# Patient Record
Sex: FEMALE | Race: BLACK OR AFRICAN AMERICAN | Employment: OTHER | URBAN - METROPOLITAN AREA
[De-identification: names, ages, dates, MRNs, and addresses within clinical notes are randomized per-mention and may not be internally consistent; named-entity substitution may affect disease eponyms.]

---

## 2017-01-30 ENCOUNTER — HOSPITAL ENCOUNTER (OUTPATIENT)
Dept: MAMMOGRAPHY | Age: 67
Discharge: HOME | End: 2017-01-30
Attending: FAMILY MEDICINE
Payer: MEDICARE

## 2017-01-30 DIAGNOSIS — Z12.31 VISIT FOR SCREENING MAMMOGRAM: ICD-10-CM

## 2017-01-30 PROCEDURE — 77067 SCR MAMMO BI INCL CAD: CPT

## 2018-03-07 ENCOUNTER — HOSPITAL ENCOUNTER (OUTPATIENT)
Dept: BONE DENSITY | Age: 68
Discharge: HOME | End: 2018-03-07
Attending: FAMILY MEDICINE
Payer: MEDICARE

## 2018-03-07 ENCOUNTER — HOSPITAL ENCOUNTER (OUTPATIENT)
Dept: MAMMOGRAPHY | Age: 68
Discharge: HOME | End: 2018-03-07
Attending: FAMILY MEDICINE
Payer: MEDICARE

## 2018-03-07 DIAGNOSIS — Z12.39 BREAST SCREENING: ICD-10-CM

## 2018-03-07 DIAGNOSIS — M81.0 OSTEOPOROSIS: ICD-10-CM

## 2018-03-07 PROCEDURE — 77080 DXA BONE DENSITY AXIAL: CPT

## 2018-03-07 PROCEDURE — 77067 SCR MAMMO BI INCL CAD: CPT

## 2019-04-04 ENCOUNTER — HOSPITAL ENCOUNTER (OUTPATIENT)
Dept: MAMMOGRAPHY | Age: 69
Discharge: HOME | End: 2019-04-04
Attending: FAMILY MEDICINE
Payer: MEDICARE

## 2019-04-04 DIAGNOSIS — Z12.39 SCREENING BREAST EXAMINATION: ICD-10-CM

## 2019-04-04 PROCEDURE — 77067 SCR MAMMO BI INCL CAD: CPT

## 2021-04-26 ENCOUNTER — TRANSCRIBE ORDER (OUTPATIENT)
Dept: SCHEDULING | Age: 71
End: 2021-04-26

## 2021-04-26 DIAGNOSIS — M81.0 OSTEOPOROSIS: ICD-10-CM

## 2021-04-26 DIAGNOSIS — Z12.31 VISIT FOR SCREENING MAMMOGRAM: Primary | ICD-10-CM

## 2021-07-13 ENCOUNTER — APPOINTMENT (OUTPATIENT)
Dept: GENERAL RADIOLOGY | Age: 71
DRG: 177 | End: 2021-07-13
Attending: EMERGENCY MEDICINE
Payer: MEDICARE

## 2021-07-13 ENCOUNTER — APPOINTMENT (OUTPATIENT)
Dept: CT IMAGING | Age: 71
DRG: 177 | End: 2021-07-13
Attending: EMERGENCY MEDICINE
Payer: MEDICARE

## 2021-07-13 ENCOUNTER — HOSPITAL ENCOUNTER (INPATIENT)
Age: 71
LOS: 10 days | Discharge: LEFT BEFORE DISCHARGE | DRG: 177 | End: 2021-07-23
Attending: EMERGENCY MEDICINE | Admitting: FAMILY MEDICINE
Payer: MEDICARE

## 2021-07-13 DIAGNOSIS — E11.65 TYPE 2 DIABETES MELLITUS WITH HYPERGLYCEMIA, WITHOUT LONG-TERM CURRENT USE OF INSULIN (HCC): ICD-10-CM

## 2021-07-13 DIAGNOSIS — U07.1 COVID-19: Primary | ICD-10-CM

## 2021-07-13 DIAGNOSIS — E86.0 DEHYDRATION: ICD-10-CM

## 2021-07-13 DIAGNOSIS — R73.9 HYPERGLYCEMIA: ICD-10-CM

## 2021-07-13 DIAGNOSIS — R45.1 AGITATION: ICD-10-CM

## 2021-07-13 DIAGNOSIS — R09.02 HYPOXIA: ICD-10-CM

## 2021-07-13 DIAGNOSIS — R53.81 DEBILITY: ICD-10-CM

## 2021-07-13 LAB
ALBUMIN SERPL-MCNC: 3.4 G/DL (ref 3.5–5)
ALBUMIN/GLOB SERPL: 0.7 {RATIO} (ref 1.1–2.2)
ALP SERPL-CCNC: 66 U/L (ref 45–117)
ALT SERPL-CCNC: 16 U/L (ref 12–78)
ANION GAP SERPL CALC-SCNC: 10 MMOL/L (ref 5–15)
ARTERIAL PATENCY WRIST A: YES
AST SERPL-CCNC: 28 U/L (ref 15–37)
BASE DEFICIT BLDA-SCNC: 4.3 MMOL/L
BASOPHILS # BLD: 0 K/UL (ref 0–0.1)
BASOPHILS NFR BLD: 0 % (ref 0–1)
BDY SITE: ABNORMAL
BILIRUB SERPL-MCNC: 0.6 MG/DL (ref 0.2–1)
BUN SERPL-MCNC: 16 MG/DL (ref 6–20)
BUN/CREAT SERPL: 13 (ref 12–20)
CALCIUM SERPL-MCNC: 9.1 MG/DL (ref 8.5–10.1)
CHLORIDE SERPL-SCNC: 101 MMOL/L (ref 97–108)
CO2 SERPL-SCNC: 23 MMOL/L (ref 21–32)
COMMENT, HOLDF: NORMAL
COVID-19 RAPID TEST, COVR: DETECTED
CREAT SERPL-MCNC: 1.26 MG/DL (ref 0.55–1.02)
CRP SERPL-MCNC: 11.7 MG/DL (ref 0–0.6)
DIFFERENTIAL METHOD BLD: NORMAL
EOSINOPHIL # BLD: 0 K/UL (ref 0–0.4)
EOSINOPHIL NFR BLD: 0 % (ref 0–7)
ERYTHROCYTE [DISTWIDTH] IN BLOOD BY AUTOMATED COUNT: 13.9 % (ref 11.5–14.5)
GAS FLOW.O2 O2 DELIVERY SYS: 5 L/MIN
GLOBULIN SER CALC-MCNC: 5 G/DL (ref 2–4)
GLUCOSE SERPL-MCNC: 342 MG/DL (ref 65–100)
HCO3 BLDA-SCNC: 19 MMOL/L (ref 22–26)
HCT VFR BLD AUTO: 36.6 % (ref 35–47)
HGB BLD-MCNC: 12 G/DL (ref 11.5–16)
IMM GRANULOCYTES # BLD AUTO: 0 K/UL (ref 0–0.04)
IMM GRANULOCYTES NFR BLD AUTO: 0 % (ref 0–0.5)
LACTATE SERPL-SCNC: 1.4 MMOL/L (ref 0.4–2)
LYMPHOCYTES # BLD: 0.8 K/UL (ref 0.8–3.5)
LYMPHOCYTES NFR BLD: 15 % (ref 12–49)
MCH RBC QN AUTO: 27.8 PG (ref 26–34)
MCHC RBC AUTO-ENTMCNC: 32.8 G/DL (ref 30–36.5)
MCV RBC AUTO: 84.9 FL (ref 80–99)
MONOCYTES # BLD: 0.5 K/UL (ref 0–1)
MONOCYTES NFR BLD: 10 % (ref 5–13)
NEUTS SEG # BLD: 3.9 K/UL (ref 1.8–8)
NEUTS SEG NFR BLD: 75 % (ref 32–75)
NRBC # BLD: 0 K/UL (ref 0–0.01)
NRBC BLD-RTO: 0 PER 100 WBC
PCO2 BLDA: 31 MMHG (ref 35–45)
PH BLDA: 7.41 [PH] (ref 7.35–7.45)
PLATELET # BLD AUTO: 256 K/UL (ref 150–400)
PMV BLD AUTO: 11.4 FL (ref 8.9–12.9)
PO2 BLDA: 64 MMHG (ref 80–100)
POTASSIUM SERPL-SCNC: 4.1 MMOL/L (ref 3.5–5.1)
PROCALCITONIN SERPL-MCNC: 0.08 NG/ML
PROT SERPL-MCNC: 8.4 G/DL (ref 6.4–8.2)
RBC # BLD AUTO: 4.31 M/UL (ref 3.8–5.2)
SAMPLES BEING HELD,HOLD: NORMAL
SAO2 % BLD: 93 % (ref 92–97)
SAO2% DEVICE SAO2% SENSOR NAME: ABNORMAL
SODIUM SERPL-SCNC: 134 MMOL/L (ref 136–145)
SOURCE, COVRS: ABNORMAL
SPECIMEN SITE: ABNORMAL
TROPONIN I SERPL-MCNC: <0.05 NG/ML
WBC # BLD AUTO: 5.2 K/UL (ref 3.6–11)

## 2021-07-13 PROCEDURE — 74011250637 HC RX REV CODE- 250/637: Performed by: FAMILY MEDICINE

## 2021-07-13 PROCEDURE — 71045 X-RAY EXAM CHEST 1 VIEW: CPT

## 2021-07-13 PROCEDURE — 84145 PROCALCITONIN (PCT): CPT

## 2021-07-13 PROCEDURE — 36415 COLL VENOUS BLD VENIPUNCTURE: CPT

## 2021-07-13 PROCEDURE — 85025 COMPLETE CBC W/AUTO DIFF WBC: CPT

## 2021-07-13 PROCEDURE — 74011250636 HC RX REV CODE- 250/636: Performed by: FAMILY MEDICINE

## 2021-07-13 PROCEDURE — 74011000636 HC RX REV CODE- 636: Performed by: RADIOLOGY

## 2021-07-13 PROCEDURE — 82803 BLOOD GASES ANY COMBINATION: CPT

## 2021-07-13 PROCEDURE — 87635 SARS-COV-2 COVID-19 AMP PRB: CPT

## 2021-07-13 PROCEDURE — 86140 C-REACTIVE PROTEIN: CPT

## 2021-07-13 PROCEDURE — 84484 ASSAY OF TROPONIN QUANT: CPT

## 2021-07-13 PROCEDURE — 65660000000 HC RM CCU STEPDOWN

## 2021-07-13 PROCEDURE — 74011000258 HC RX REV CODE- 258: Performed by: FAMILY MEDICINE

## 2021-07-13 PROCEDURE — 74011250636 HC RX REV CODE- 250/636: Performed by: NURSE PRACTITIONER

## 2021-07-13 PROCEDURE — XW033E5 INTRODUCTION OF REMDESIVIR ANTI-INFECTIVE INTO PERIPHERAL VEIN, PERCUTANEOUS APPROACH, NEW TECHNOLOGY GROUP 5: ICD-10-PCS | Performed by: INTERNAL MEDICINE

## 2021-07-13 PROCEDURE — 80053 COMPREHEN METABOLIC PANEL: CPT

## 2021-07-13 PROCEDURE — 74011250636 HC RX REV CODE- 250/636: Performed by: EMERGENCY MEDICINE

## 2021-07-13 PROCEDURE — 87040 BLOOD CULTURE FOR BACTERIA: CPT

## 2021-07-13 PROCEDURE — 36600 WITHDRAWAL OF ARTERIAL BLOOD: CPT

## 2021-07-13 PROCEDURE — 83605 ASSAY OF LACTIC ACID: CPT

## 2021-07-13 PROCEDURE — 74011000250 HC RX REV CODE- 250: Performed by: FAMILY MEDICINE

## 2021-07-13 PROCEDURE — 8E0ZXY6 ISOLATION: ICD-10-PCS | Performed by: HOSPITALIST

## 2021-07-13 PROCEDURE — 74011000258 HC RX REV CODE- 258: Performed by: EMERGENCY MEDICINE

## 2021-07-13 PROCEDURE — 96365 THER/PROPH/DIAG IV INF INIT: CPT

## 2021-07-13 PROCEDURE — 99285 EMERGENCY DEPT VISIT HI MDM: CPT

## 2021-07-13 PROCEDURE — 71275 CT ANGIOGRAPHY CHEST: CPT

## 2021-07-13 RX ORDER — ACETAMINOPHEN 325 MG/1
650 TABLET ORAL
Status: DISCONTINUED | OUTPATIENT
Start: 2021-07-13 | End: 2021-07-23 | Stop reason: HOSPADM

## 2021-07-13 RX ORDER — GUAIFENESIN 100 MG/5ML
81 LIQUID (ML) ORAL DAILY
Status: DISCONTINUED | OUTPATIENT
Start: 2021-07-13 | End: 2021-07-23 | Stop reason: HOSPADM

## 2021-07-13 RX ORDER — HYDRALAZINE HYDROCHLORIDE 20 MG/ML
10 INJECTION INTRAMUSCULAR; INTRAVENOUS
Status: DISCONTINUED | OUTPATIENT
Start: 2021-07-13 | End: 2021-07-23 | Stop reason: HOSPADM

## 2021-07-13 RX ORDER — ASCORBIC ACID 500 MG
500 TABLET ORAL 2 TIMES DAILY
Status: DISCONTINUED | OUTPATIENT
Start: 2021-07-13 | End: 2021-07-23 | Stop reason: HOSPADM

## 2021-07-13 RX ORDER — SODIUM CHLORIDE 9 MG/ML
75 INJECTION, SOLUTION INTRAVENOUS CONTINUOUS
Status: DISCONTINUED | OUTPATIENT
Start: 2021-07-13 | End: 2021-07-15

## 2021-07-13 RX ORDER — SODIUM CHLORIDE 0.9 % (FLUSH) 0.9 %
5-40 SYRINGE (ML) INJECTION AS NEEDED
Status: DISCONTINUED | OUTPATIENT
Start: 2021-07-13 | End: 2021-07-23 | Stop reason: HOSPADM

## 2021-07-13 RX ORDER — DEXAMETHASONE 4 MG/1
6 TABLET ORAL DAILY
Status: DISCONTINUED | OUTPATIENT
Start: 2021-07-14 | End: 2021-07-13

## 2021-07-13 RX ORDER — HYDRALAZINE HYDROCHLORIDE 20 MG/ML
10 INJECTION INTRAMUSCULAR; INTRAVENOUS
Status: DISCONTINUED | OUTPATIENT
Start: 2021-07-13 | End: 2021-07-13

## 2021-07-13 RX ORDER — HEPARIN SODIUM 5000 [USP'U]/ML
5000 INJECTION, SOLUTION INTRAVENOUS; SUBCUTANEOUS EVERY 8 HOURS
Status: DISCONTINUED | OUTPATIENT
Start: 2021-07-13 | End: 2021-07-14 | Stop reason: ALTCHOICE

## 2021-07-13 RX ORDER — ZINC SULFATE 50(220)MG
1 CAPSULE ORAL DAILY
Status: DISCONTINUED | OUTPATIENT
Start: 2021-07-14 | End: 2021-07-23 | Stop reason: HOSPADM

## 2021-07-13 RX ORDER — SODIUM CHLORIDE 0.9 % (FLUSH) 0.9 %
5-40 SYRINGE (ML) INJECTION EVERY 8 HOURS
Status: DISCONTINUED | OUTPATIENT
Start: 2021-07-13 | End: 2021-07-23 | Stop reason: HOSPADM

## 2021-07-13 RX ADMIN — CEFTRIAXONE SODIUM 2 G: 2 INJECTION, POWDER, FOR SOLUTION INTRAMUSCULAR; INTRAVENOUS at 11:25

## 2021-07-13 RX ADMIN — HYDRALAZINE HYDROCHLORIDE 10 MG: 20 INJECTION INTRAMUSCULAR; INTRAVENOUS at 22:35

## 2021-07-13 RX ADMIN — Medication 10 ML: at 22:18

## 2021-07-13 RX ADMIN — SODIUM CHLORIDE 500 ML: 9 INJECTION, SOLUTION INTRAVENOUS at 11:40

## 2021-07-13 RX ADMIN — HEPARIN SODIUM 5000 UNITS: 5000 INJECTION INTRAVENOUS; SUBCUTANEOUS at 15:47

## 2021-07-13 RX ADMIN — DEXAMETHASONE 6 MG: 4 TABLET ORAL at 15:47

## 2021-07-13 RX ADMIN — HYDRALAZINE HYDROCHLORIDE 10 MG: 20 INJECTION, SOLUTION INTRAMUSCULAR; INTRAVENOUS at 15:47

## 2021-07-13 RX ADMIN — HEPARIN SODIUM 5000 UNITS: 5000 INJECTION INTRAVENOUS; SUBCUTANEOUS at 22:18

## 2021-07-13 RX ADMIN — AZITHROMYCIN MONOHYDRATE 500 MG: 500 INJECTION, POWDER, LYOPHILIZED, FOR SOLUTION INTRAVENOUS at 12:36

## 2021-07-13 RX ADMIN — REMDESIVIR 200 MG: 100 INJECTION, POWDER, LYOPHILIZED, FOR SOLUTION INTRAVENOUS at 15:03

## 2021-07-13 RX ADMIN — SODIUM CHLORIDE 75 ML/HR: 9 INJECTION, SOLUTION INTRAVENOUS at 16:06

## 2021-07-13 RX ADMIN — IOPAMIDOL 80 ML: 755 INJECTION, SOLUTION INTRAVENOUS at 10:16

## 2021-07-13 RX ADMIN — OXYCODONE HYDROCHLORIDE AND ACETAMINOPHEN 500 MG: 500 TABLET ORAL at 18:58

## 2021-07-13 RX ADMIN — Medication 10 ML: at 15:04

## 2021-07-13 RX ADMIN — ASPIRIN 81 MG: 81 TABLET, CHEWABLE ORAL at 15:47

## 2021-07-13 NOTE — Clinical Note
Status[de-identified] INPATIENT [101]   Type of Bed: Telemetry [19]   Cardiac Monitoring Required?: Yes   Inpatient Hospitalization Certified Necessary for the Following Reasons: 9.  Other (further clarification in H&P documentation)   Admitting Diagnosis: COVID-19 [[id number]]   Admitting Physician: Zelalem Crystal [[id number]]   Attending Physician: Betito Parra   Estimated Length of Stay: 3-4 Midnights   Discharge Plan[de-identified] Home with Office Follow-up

## 2021-07-13 NOTE — ED PROVIDER NOTES
70-year-old female without any significant medical history presents to the emergency department today with chief complaint of shortness of breath and generalized myalgias and fatigue. Is been going on for several days to a week. She was diagnosed with Covid at her primary care's office couple of days ago. EMS reports that she was hypoxic on room air in the low 80s. She endorses chest pain, myalgias, fatigue. No nausea or vomiting. The history is provided by the patient and medical records. Shortness of Breath  This is a new problem. The problem occurs continuously. The current episode started more than 2 days ago. The problem has been rapidly worsening. Associated symptoms include a fever, headaches, sore throat, cough, chest pain and abdominal pain. Pertinent negatives include no vomiting, no rash and no leg swelling. No past medical history on file. No past surgical history on file. No family history on file. Social History     Socioeconomic History    Marital status:      Spouse name: Not on file    Number of children: Not on file    Years of education: Not on file    Highest education level: Not on file   Occupational History    Not on file   Tobacco Use    Smoking status: Not on file   Substance and Sexual Activity    Alcohol use: Not on file    Drug use: Not on file    Sexual activity: Not on file   Other Topics Concern    Not on file   Social History Narrative    Not on file     Social Determinants of Health     Financial Resource Strain:     Difficulty of Paying Living Expenses:    Food Insecurity:     Worried About Running Out of Food in the Last Year:     [address] in the Last Year:    Transportation Needs:     Lack of Transportation (Medical):      Lack of Transportation (Non-Medical):    Physical Activity:     Days of Exercise per Week:     Minutes of Exercise per Session:    Stress:     Feeling of Stress :    Social Connections:     Frequency of Communication with Friends and Family:     Frequency of Social Gatherings with Friends and Family:     Attends [employer] Services:     Active Member of Clubs or Organizations:     Attends Club or Organization Meetings:     Marital Status:    Intimate Partner Violence:     Fear of Current or Ex-Partner:     Emotionally Abused:     Physically Abused:     Sexually Abused: ALLERGIES: Patient has no allergy information on record. Review of Systems   Constitutional: Positive for fatigue and fever. HENT: Positive for sore throat. Negative for sneezing. Respiratory: Positive for cough and shortness of breath. Cardiovascular: Positive for chest pain. Negative for leg swelling. Gastrointestinal: Positive for abdominal pain. Negative for diarrhea, nausea and vomiting. Genitourinary: Negative for difficulty urinating and dysuria. Musculoskeletal: Negative for arthralgias and myalgias. Skin: Negative for color change and rash. Neurological: Positive for headaches. Negative for weakness. Psychiatric/Behavioral: Negative for agitation and behavioral problems. There were no vitals filed for this visit. Physical Exam  Vitals and nursing note reviewed. Constitutional:       General: She is not in acute distress. Appearance: Normal appearance. She is well-developed. She is not ill-appearing, toxic-appearing or diaphoretic. HENT:      Head: Normocephalic and atraumatic. Nose: Nose normal.      Mouth/Throat:      Mouth: Mucous membranes are moist.      Pharynx: Oropharynx is clear. Eyes:      Extraocular Movements: Extraocular movements intact. Conjunctiva/sclera: Conjunctivae normal.      Pupils: Pupils are equal, round, and reactive to light. Cardiovascular:      Rate and Rhythm: Normal rate and regular rhythm. Pulses: Normal pulses. Heart sounds: Normal heart sounds. Pulmonary:      Effort: Pulmonary effort is normal. No respiratory distress. Breath sounds: Normal breath sounds. No wheezing. Chest:      Chest wall: No tenderness. Abdominal:      General: Abdomen is flat. There is no distension. Palpations: Abdomen is soft. Tenderness: There is no abdominal tenderness. There is no guarding or rebound. Musculoskeletal:         General: No swelling, tenderness, deformity or signs of injury. Normal range of motion. Cervical back: Normal range of motion and neck supple. No rigidity. No muscular tenderness. Right lower leg: No edema. Left lower leg: No edema. Skin:     General: Skin is warm and dry. Capillary Refill: Capillary refill takes less than 2 seconds. Neurological:      General: No focal deficit present. Mental Status: She is alert and oriented to person, place, and time. Psychiatric:         Mood and Affect: Mood normal.         Behavior: Behavior normal.          MDM  Number of Diagnoses or Management Options  Diagnosis management comments: 22-year-old female presents as above with Covid pneumonia and room air hypoxia. She has some focality on chest x-ray and antibiotics were started in the emergency department. With a negative procalcitonin and significantly elevated I am less suspicious for bacterial process. Plan for admission to hospital for further management. Amount and/or Complexity of Data Reviewed  Clinical lab tests: reviewed  Tests in the radiology section of CPT®: reviewed           Procedures               Perfect Serve Consult for Admission  11:46 AM    ED Room Number: ER09/09  Patient Name and age:  Stefanie Blake 79 y.o.  female  Working Diagnosis:   1. COVID-19    2. Hypoxia    3. Hyperglycemia    4.  Dehydration        COVID-19 Suspicion:  yes  Sepsis present:  no  Reassessment needed: N/A  Code Status:  Full Code  Readmission: no  Isolation Requirements:  yes  Recommended Level of Care:  telemetry  Department:[facility] Adult ED - 21   Other: Final CT read pending, no PE on my read.   Symptoms are likely all secondary to Covid

## 2021-07-13 NOTE — ROUTINE PROCESS
TRANSFER - IN REPORT:    Verbal report received from [facility] (name) on Aliza Nagy  being received from ED(unit) for routine progression of care      Report consisted of patients Situation, Background, Assessment and   Recommendations(SBAR). Information from the following report(s) SBAR, Kardex, ED Summary, Procedure Summary, Intake/Output, MAR, Accordion, Recent Results, Med Rec Status, Cardiac Rhythm NSR, Alarm Parameters , Pre Procedure Checklist, Procedure Verification, Quality Measures and Dual Neuro Assessment was reviewed with the receiving nurse. Opportunity for questions and clarification was provided. Assessment completed upon patients arrival to unit and care assumed.    Primary Nurse Christa Patino RN and LEDY kirkland performed a dual skin assessment on this patient No impairment noted  Alexys score is 21

## 2021-07-13 NOTE — H&P
History & Physical    Primary Care Provider: Gabe Beltrán MD  Source of Information: Patient     History of Presenting Illness:   Vern Linares is a 79 y.o. female who presents with shortness of breath    Patient presents to the ER complaining of shortness of breath fever and fatigue. Patient also complains of malaise and myalgias. Patient was diagnosed with Covid 2 days ago. Patient reports that this has been going on for about a week to 10 days. When EMS arrived at patient's house they found that the patient was hypoxic with pulse ox in the 80s. Patient came to be ER, was found to be hypoxic and was requested to be admitted to the hospital service. Patient denies any other complaints or problems. The patient denies any Headache, blurry vision, sore throat, trouble swallowing, trouble with speech, chest pain, fever, chills, N/V/D, abd pain, urinary symptoms, constipation, recent travels, sick contacts, focal or generalized neurological symptoms,  falls, injuries, rashes, contact with COVID-19 diagnosed patients, hematemesis, melena, hemoptysis, hematuria, rashes, denies starting any new medications and denies any other concerns or problems besides as mentioned above. Review of Systems:  A comprehensive review of systems was negative except for that written in the History of Present Illness. All other systems reviewed, pertinent positives and negatives noted in HPI    No past medical history on file. No past surgical history on file. Prior to Admission medications    Not on File     No Known Allergies   No family history on file. Family history was discussed with the patient, all pertinent and relevant details are mentioned as above, no other pertinent and relevant family history was noted on my discussion with the patient.   Patient specifically denies any history of Gaucher disease in the family  SOCIAL HISTORY:  Patient resides:  [facility]    SNF    With family care       Smoking history:   None x   Former    Chronic      Alcohol history:   None    Social x   Chronic      Ambulates:   Independently x   w/cane    w/walker    w/wc    CODE STATUS:  DNR    Full x   Other      Objective:     Physical Exam:     Visit Vitals  BP (!) 198/90   Pulse 94   Temp 98.5 °F (36.9 °C)   Resp (!) 38   Ht 5' 5\" (1.651 m)   Wt 92.1 kg (203 lb)   SpO2 96%   BMI 33.78 kg/m²    O2 Flow Rate (L/min): 5 l/min O2 Device: Nasal cannula    General : alert x 3, awake, no acute distress, resting in bed, pleasant female, appears to be stated age  HEENT: PEERL, EOMI, moist mucus membrane, TM clear  Neck: supple, no JVD, no meningeal signs  Chest: Scattered rhonchi throughout lung fields  CVS: S1 S2 heard, Capillary refill less than 2 seconds  Abd: soft/ Non tender, non distended, BS physiological,   Ext: no clubbing, no cyanosis, no edema, brisk 2+ DP pulses  Neuro/Psych: pleasant mood and affect, CN 2-12 grossly intact,   Skin: warm     EKG: Pending      Data Review:     Recent Days:  Recent Labs     07/13/21  1031   WBC 5.2   HGB 12.0   HCT 36.6        Recent Labs     07/13/21  1031   *   K 4.1      CO2 23   *   BUN 16   CREA 1.26*   CA 9.1   ALB 3.4*   ALT 16     No results for input(s): PH, PCO2, PO2, HCO3, FIO2 in the last 72 hours.     24 Hour Results:  Recent Results (from the past 24 hour(s))   CBC WITH AUTOMATED DIFF    Collection Time: 07/13/21 10:31 AM   Result Value Ref Range    WBC 5.2 3.6 - 11.0 K/uL    RBC 4.31 3.80 - 5.20 M/uL    HGB 12.0 11.5 - 16.0 g/dL    HCT 36.6 35.0 - 47.0 %    MCV 84.9 80.0 - 99.0 FL    MCH 27.8 26.0 - 34.0 PG    MCHC 32.8 30.0 - 36.5 g/dL    RDW 13.9 11.5 - 14.5 %    PLATELET 004 029 - 857 K/uL    MPV 11.4 8.9 - 12.9 FL    NRBC 0.0 0  WBC    ABSOLUTE NRBC 0.00 0.00 - 0.01 K/uL    NEUTROPHILS 75 32 - 75 %    LYMPHOCYTES 15 12 - 49 %    MONOCYTES 10 5 - 13 %    EOSINOPHILS 0 0 - 7 %    BASOPHILS 0 0 - 1 %    IMMATURE GRANULOCYTES 0 0.0 - 0.5 %    ABS. NEUTROPHILS 3.9 1.8 - 8.0 K/UL    ABS. LYMPHOCYTES 0.8 0.8 - 3.5 K/UL    ABS. MONOCYTES 0.5 0.0 - 1.0 K/UL    ABS. EOSINOPHILS 0.0 0.0 - 0.4 K/UL    ABS. BASOPHILS 0.0 0.0 - 0.1 K/UL    ABS. IMM. GRANS. 0.0 0.00 - 0.04 K/UL    DF AUTOMATED     PROCALCITONIN    Collection Time: 07/13/21 10:31 AM   Result Value Ref Range    Procalcitonin 0.08 ng/mL   C REACTIVE PROTEIN, QT    Collection Time: 07/13/21 10:31 AM   Result Value Ref Range    C-Reactive protein 11.70 (H) 0.00 - 6.81 mg/dL   METABOLIC PANEL, COMPREHENSIVE    Collection Time: 07/13/21 10:31 AM   Result Value Ref Range    Sodium 134 (L) 136 - 145 mmol/L    Potassium 4.1 3.5 - 5.1 mmol/L    Chloride 101 97 - 108 mmol/L    CO2 23 21 - 32 mmol/L    Anion gap 10 5 - 15 mmol/L    Glucose 342 (H) 65 - 100 mg/dL    BUN 16 6 - 20 MG/DL    Creatinine 1.26 (H) 0.55 - 1.02 MG/DL    BUN/Creatinine ratio 13 12 - 20      GFR est AA 51 (L) >60 ml/min/1.73m2    GFR est non-AA 42 (L) >60 ml/min/1.73m2    Calcium 9.1 8.5 - 10.1 MG/DL    Bilirubin, total 0.6 0.2 - 1.0 MG/DL    ALT (SGPT) 16 12 - 78 U/L    AST (SGOT) 28 15 - 37 U/L    Alk. phosphatase 66 45 - 117 U/L    Protein, total 8.4 (H) 6.4 - 8.2 g/dL    Albumin 3.4 (L) 3.5 - 5.0 g/dL    Globulin 5.0 (H) 2.0 - 4.0 g/dL    A-G Ratio 0.7 (L) 1.1 - 2.2     SAMPLES BEING HELD    Collection Time: 07/13/21 10:31 AM   Result Value Ref Range    SAMPLES BEING HELD 1BLU     COMMENT        Add-on orders for these samples will be processed based on acceptable specimen integrity and analyte stability, which may vary by analyte.    COVID-19 RAPID TEST    Collection Time: 07/13/21 10:32 AM   Result Value Ref Range    Specimen source Nasopharyngeal      COVID-19 rapid test Detected (AA) NOTD     LACTIC ACID    Collection Time: 07/13/21 10:56 AM   Result Value Ref Range    Lactic acid 1.4 0.4 - 2.0 MMOL/L         Imaging:   CTA CHEST W OR W WO CONT    Result Date: 7/13/2021  Subpleural interstitial thickening throughout both lung fields may be related to atypical viral pneumonia. No evidence of pulmonary embolism. XR CHEST PORT    Result Date: 7/13/2021  Left basilar airspace disease/effusion. Assessment/Plan     Acute hypoxic respiratory failure  SARS-CoV-2 infection  Viral pneumonitis  Hyperglycemia  Acute kidney injury  Elevated troponin  Accelerated hypertension      Patient will be admitted on a telemetry bed  Start patient on broad-spectrum IV antibiotics, vitamin C, zinc, Decadron, patient may be a candidate for remdesivir, consult pharmacy, gentle IV hydration, order inflammatory markers, supportive care and further intervention per hospital course, continue to monitor, oxygen support, ABG, reassess as needed  Sliding scale insulin, Accu-Cheks, diet control, close monitoring  KHARI likely stimulable, gentle IV hydration, repeat labs in the morning, avoid nephrotoxic medication, renally dose all medication, supportive care and trend creatinine  Elevated troponin likely NSTEMI type II, patient denies any chest pain currently, cycle troponins, aspirin, echo, telemetry, supportive care, if troponin trending up may consider further intervention and diagnostics including cardiology consult  Hydralazine as needed, optimize blood pressure control, continue to monitor    GI DVT prophylaxis: Patient will be on heparin    Critical care time 48 minutes           Please note that this dictation was completed with [employer], the computer voice recognition software. Quite often unanticipated grammatical, syntax, homophones, and other interpretive errors are inadvertently transcribed by the computer software. Please disregard these errors. Please excuse any errors that have escaped final proofreading.          Signed By: Parul Ibanez MD     July 13, 2021

## 2021-07-14 LAB
GLUCOSE BLD STRIP.AUTO-MCNC: 288 MG/DL (ref 65–117)
GLUCOSE BLD STRIP.AUTO-MCNC: 338 MG/DL (ref 65–117)
GLUCOSE BLD STRIP.AUTO-MCNC: 357 MG/DL (ref 65–117)
SERVICE CMNT-IMP: ABNORMAL

## 2021-07-14 PROCEDURE — 74011250636 HC RX REV CODE- 250/636: Performed by: FAMILY MEDICINE

## 2021-07-14 PROCEDURE — 77010033678 HC OXYGEN DAILY

## 2021-07-14 PROCEDURE — 74011250636 HC RX REV CODE- 250/636: Performed by: NURSE PRACTITIONER

## 2021-07-14 PROCEDURE — 74011250636 HC RX REV CODE- 250/636: Performed by: STUDENT IN AN ORGANIZED HEALTH CARE EDUCATION/TRAINING PROGRAM

## 2021-07-14 PROCEDURE — 74011636637 HC RX REV CODE- 636/637: Performed by: STUDENT IN AN ORGANIZED HEALTH CARE EDUCATION/TRAINING PROGRAM

## 2021-07-14 PROCEDURE — 65660000000 HC RM CCU STEPDOWN

## 2021-07-14 PROCEDURE — 74011250636 HC RX REV CODE- 250/636: Performed by: INTERNAL MEDICINE

## 2021-07-14 PROCEDURE — 82962 GLUCOSE BLOOD TEST: CPT

## 2021-07-14 PROCEDURE — 94760 N-INVAS EAR/PLS OXIMETRY 1: CPT

## 2021-07-14 PROCEDURE — 74011000258 HC RX REV CODE- 258: Performed by: FAMILY MEDICINE

## 2021-07-14 PROCEDURE — 74011250637 HC RX REV CODE- 250/637: Performed by: STUDENT IN AN ORGANIZED HEALTH CARE EDUCATION/TRAINING PROGRAM

## 2021-07-14 PROCEDURE — 74011250637 HC RX REV CODE- 250/637: Performed by: FAMILY MEDICINE

## 2021-07-14 PROCEDURE — 74011000250 HC RX REV CODE- 250: Performed by: FAMILY MEDICINE

## 2021-07-14 PROCEDURE — 74011000258 HC RX REV CODE- 258: Performed by: INTERNAL MEDICINE

## 2021-07-14 RX ORDER — INSULIN GLARGINE 100 [IU]/ML
15 INJECTION, SOLUTION SUBCUTANEOUS
Status: DISCONTINUED | OUTPATIENT
Start: 2021-07-14 | End: 2021-07-15

## 2021-07-14 RX ORDER — ROSUVASTATIN CALCIUM 40 MG/1
40 TABLET, COATED ORAL
COMMUNITY

## 2021-07-14 RX ORDER — DEXAMETHASONE SODIUM PHOSPHATE 10 MG/ML
6 INJECTION INTRAMUSCULAR; INTRAVENOUS EVERY 24 HOURS
Status: COMPLETED | OUTPATIENT
Start: 2021-07-15 | End: 2021-07-22

## 2021-07-14 RX ORDER — MAGNESIUM SULFATE 100 %
3 CRYSTALS MISCELLANEOUS AS NEEDED
Status: DISCONTINUED | OUTPATIENT
Start: 2021-07-14 | End: 2021-07-23 | Stop reason: HOSPADM

## 2021-07-14 RX ORDER — INSULIN LISPRO 100 [IU]/ML
9 INJECTION, SOLUTION INTRAVENOUS; SUBCUTANEOUS ONCE
Status: COMPLETED | OUTPATIENT
Start: 2021-07-14 | End: 2021-07-14

## 2021-07-14 RX ORDER — METOPROLOL TARTRATE 50 MG/1
50 TABLET ORAL 2 TIMES DAILY
COMMUNITY

## 2021-07-14 RX ORDER — AMLODIPINE BESYLATE AND BENAZEPRIL HYDROCHLORIDE 10; 40 MG/1; MG/1
1 CAPSULE ORAL DAILY
COMMUNITY

## 2021-07-14 RX ORDER — ENOXAPARIN SODIUM 100 MG/ML
40 INJECTION SUBCUTANEOUS EVERY 12 HOURS
Status: DISCONTINUED | OUTPATIENT
Start: 2021-07-14 | End: 2021-07-18

## 2021-07-14 RX ORDER — METOPROLOL TARTRATE 50 MG/1
50 TABLET ORAL EVERY 12 HOURS
Status: DISCONTINUED | OUTPATIENT
Start: 2021-07-14 | End: 2021-07-23 | Stop reason: HOSPADM

## 2021-07-14 RX ORDER — DEXTROSE 50 % IN WATER (D50W) INTRAVENOUS SYRINGE
25 AS NEEDED
Status: DISCONTINUED | OUTPATIENT
Start: 2021-07-14 | End: 2021-07-23 | Stop reason: HOSPADM

## 2021-07-14 RX ORDER — AMLODIPINE BESYLATE 5 MG/1
10 TABLET ORAL DAILY
Status: DISCONTINUED | OUTPATIENT
Start: 2021-07-14 | End: 2021-07-23 | Stop reason: HOSPADM

## 2021-07-14 RX ADMIN — ASPIRIN 81 MG: 81 TABLET, CHEWABLE ORAL at 10:51

## 2021-07-14 RX ADMIN — AMLODIPINE BESYLATE 10 MG: 5 TABLET ORAL at 14:27

## 2021-07-14 RX ADMIN — HEPARIN SODIUM 5000 UNITS: 5000 INJECTION INTRAVENOUS; SUBCUTANEOUS at 06:42

## 2021-07-14 RX ADMIN — OXYCODONE HYDROCHLORIDE AND ACETAMINOPHEN 500 MG: 500 TABLET ORAL at 10:51

## 2021-07-14 RX ADMIN — Medication 10 ML: at 22:49

## 2021-07-14 RX ADMIN — METOPROLOL TARTRATE 50 MG: 50 TABLET, FILM COATED ORAL at 20:01

## 2021-07-14 RX ADMIN — Medication 10 ML: at 07:13

## 2021-07-14 RX ADMIN — ENOXAPARIN SODIUM 40 MG: 40 INJECTION SUBCUTANEOUS at 19:03

## 2021-07-14 RX ADMIN — AZITHROMYCIN MONOHYDRATE 500 MG: 500 INJECTION, POWDER, LYOPHILIZED, FOR SOLUTION INTRAVENOUS at 12:31

## 2021-07-14 RX ADMIN — OXYCODONE HYDROCHLORIDE AND ACETAMINOPHEN 500 MG: 500 TABLET ORAL at 19:03

## 2021-07-14 RX ADMIN — INSULIN LISPRO 9 UNITS: 100 INJECTION, SOLUTION INTRAVENOUS; SUBCUTANEOUS at 14:18

## 2021-07-14 RX ADMIN — DEXAMETHASONE 6 MG: 4 TABLET ORAL at 10:51

## 2021-07-14 RX ADMIN — CEFTRIAXONE SODIUM 1 G: 1 INJECTION, POWDER, FOR SOLUTION INTRAMUSCULAR; INTRAVENOUS at 12:27

## 2021-07-14 RX ADMIN — REMDESIVIR 100 MG: 5 INJECTION INTRAVENOUS at 14:38

## 2021-07-14 RX ADMIN — Medication 10 ML: at 14:23

## 2021-07-14 RX ADMIN — INSULIN LISPRO 3 UNITS: 100 INJECTION, SOLUTION INTRAVENOUS; SUBCUTANEOUS at 23:06

## 2021-07-14 RX ADMIN — INSULIN GLARGINE 15 UNITS: 100 INJECTION, SOLUTION SUBCUTANEOUS at 22:47

## 2021-07-14 RX ADMIN — HYDRALAZINE HYDROCHLORIDE 10 MG: 20 INJECTION INTRAMUSCULAR; INTRAVENOUS at 07:10

## 2021-07-14 RX ADMIN — Medication 1 CAPSULE: at 10:51

## 2021-07-14 RX ADMIN — TOCILIZUMAB 800 MG: 20 INJECTION, SOLUTION, CONCENTRATE INTRAVENOUS at 10:52

## 2021-07-14 RX ADMIN — INSULIN LISPRO 7 UNITS: 100 INJECTION, SOLUTION INTRAVENOUS; SUBCUTANEOUS at 17:09

## 2021-07-14 RX ADMIN — SODIUM CHLORIDE 75 ML/HR: 9 INJECTION, SOLUTION INTRAVENOUS at 06:44

## 2021-07-14 NOTE — PROGRESS NOTES
Hospitalist Progress Note    NAME: Juliana Delgado   :  1950   MRN:  [id number]       Assessment / Plan:  Acute hypoxic respiratory failure  D/t SARS-CoV-2 infection    CTA show:  Subpleural interstitial thickening throughout both lung fields may  be related to atypical viral pneumonia. No evidence of pulmonary embolism. C/w Remdesivir, dexamethasone  Acterma x 1 dose given today  C/w emperic Abx  Follow inflammatory markers  On non rebreather on my assessment, switched to high flow this afternoon, tolerating well. May need bipap/ intubation if worsen. Low threshold for ICU upgrade  Pulmonary following. DM:  She mentions not taking any meds currently  Blood sugar high her, will give lantus and sliding scale    Elevated creatinine:  Creatinine 1.26 today, not done today, will repeat today  NO baseline available    Uncontrolled HTN:  C/w Metoprolol, amlodipine  Hydralazine prn    DVT ppx:  lovenox     Morbid obesity  Estimated discharge date: Greater than 72 hours    Code status: Full code     Subjective:     Chief Complaint / Reason for Physician Visit  F/u for COVID 19 pneumonia  She was switched to high flow today    Review of Systems:  Symptom Y/N Comments  Symptom Y/N Comments   Fever/Chills n   Chest Pain n    Poor Appetite    Edema     Cough y   Abdominal Pain     Sputum y   Joint Pain     SOB/COKER y   Pruritis/Rash     Nausea/vomit    Tolerating PT/OT     Diarrhea    Tolerating Diet     Constipation    Other       Could NOT obtain due to:      Objective:     VITALS:   Last 24hrs VS reviewed since prior progress note.  Most recent are:  Patient Vitals for the past 24 hrs:   Temp Pulse Resp BP SpO2   21 1323     100 %   21 1100 98.1 °F (36.7 °C) (!) 105 27 (!) 180/70 95 %   21 1035     98 %   21 0700 (!) 96.4 °F (35.8 °C) 94 26 (!) 180/75 93 %   21 0333  93   96 %   21 0300 98.3 °F (36.8 °C) (!) 108 28 (!) 124/93 (!) 82 %   21 2300 98.1 °F (36.7 °C) (!) 102 22 (!) 179/76 95 %   07/13/21 2214 97.8 °F (36.6 °C) 98 24 (!) 190/90 95 %   07/13/21 1900 99.3 °F (37.4 °C) (!) 112 (!) 32 (!) 180/95 92 %   07/13/21 1600 100.2 °F (37.9 °C) (!) 101 24 (!) 182/58 93 %   07/13/21 1557  96 (!) 39 (!) 181/72 93 %   07/13/21 1500 100.2 °F (37.9 °C) 96 17  98 %   07/13/21 1445  (!) 107 28 (!) 174/84 93 %       Intake/Output Summary (Last 24 hours) at 7/14/2021 1422  Last data filed at 7/14/2021 1100  Gross per 24 hour   Intake    Output 1000 ml   Net -1000 ml        I had a face to face encounter and independently examined this patient on 7/14/2021, as outlined below:  PHYSICAL EXAM:  General: WD, WN. Alert, cooperative, no acute distress    EENT:  EOMI. Anicteric sclerae. MMM  Resp:  CTA bilaterally, no wheezing or rales. No accessory muscle use  CV:  Regular  rhythm,  No edema  GI:  Soft, Non distended, Non tender. +Bowel sounds  Neurologic:  Alert and oriented X 3, normal speech,   Psych:   Good insight. Not anxious nor agitated  Skin:  No rashes. No jaundice    Reviewed most current lab test results and cultures  YES  Reviewed most current radiology test results   YES  Review and summation of old records today    NO  Reviewed patient's current orders and MAR    YES  PMH/SH reviewed - no change compared to H&P  ________________________________________________________________________  Care Plan discussed with:    Comments   Patient y    Family      RN y    Care Manager     Consultant                        Multidiciplinary team rounds were held today with , nursing, pharmacist and clinical coordinator. Patient's plan of care was discussed; medications were reviewed and discharge planning was addressed.      ________________________________________________________________________  Total NON critical care TIME:  Minutes    Total CRITICAL CARE TIME Spent: 40 Minutes non procedure based      Comments   >50% of visit spent in counseling and coordination of care     ________________________________________________________________________  Corbin Hu MD     Procedures: see electronic medical records for all procedures/Xrays and details which were not copied into this note but were reviewed prior to creation of Plan. LABS:  I reviewed today's most current labs and imaging studies.   Pertinent labs include:  Recent Labs     07/13/21  1031   WBC 5.2   HGB 12.0   HCT 36.6        Recent Labs     07/13/21  1031   *   K 4.1      CO2 23   *   BUN 16   CREA 1.26*   CA 9.1   ALB 3.4*   TBILI 0.6   ALT 16       Signed: Corbin Hu MD

## 2021-07-14 NOTE — CONSULTS
Pulmonary    Reason:  Acute hypoxemic respiratory failure / COVID-19 infection  Requesting:  Dr Viktor Henson    79 female with hyperglycemia and htn was dx with COVID 19 7-10 days ago and now presents with increasing shortness of breath. Noted to be hypoxemic.       CTA --> no PE, interstitial pneumonitis c/w atypical pneumonia  CRP 11  Trop < 0.05  Procal 0.08  Lact 1.4    Has been started on abx with rocephin and azithromycin, remdesivir and decadron  O2 requirement increased from 5 L/min to 15 L/min with O2 sats of 93% and RR in mid 20's     Patient Vitals for the past 4 hrs:   BP Temp Pulse Resp SpO2   21 0700 (!) 180/75 (!) 96.4 °F (35.8 °C) 94 26 93 %   Temp (24hrs), Av.6 °F (37 °C), Min:96.4 °F (35.8 °C), Max:100.2 °F (37.9 °C)      Intake/Output Summary (Last 24 hours) at 2021 0857  Last data filed at 2021 1500  Gross per 24 hour   Intake    Output 300 ml   Net -300 ml     Lab:  Recent Labs     21  1542 21  1056 21  1031   WBC  --   --  5.2   HGB  --   --  12.0   PLT  --   --  256   NA  --   --  134*   K  --   --  4.1   CL  --   --  101   CO2  --   --  23   BUN  --   --  16   CREA  --   --  1.26*   GLU  --   --  342*   CA  --   --  9.1   TROIQ <0.05  --   --    TBILI  --   --  0.6   LAC  --  1.4  --      Rapid COVID test = positive      Pulmonary Impression / Recommendations:    Acute hypoxemic resp failure secondary to covid pneumonia - clinically worsening over the last week to 10 days - is now on midflow O2 with elevated CRP and is critically ill and at very high risk for deterioration of cardiopulmonary stauts    Hyperglycemia    --O2 may require high flow / NIPPV  --Low threshold for ICU evaluation  --agree with empiric abx with rocephin and azithromycin  --has been started on remdesivr and decadron as well as subQ heparin  --given her elevated inflammatory markers with progression of hypoxemia and high risk for deterioration would recommend dosing with actemra (ordered)    The patient is critically ill and is at significant risk of decompensation. Critical Care time spent exclusive of procedures 30 minutes.             Mary Cerda MD

## 2021-07-14 NOTE — PROGRESS NOTES
Bedside shift change report given to Lita RN (oncoming nurse) by Eugene Ledesma RN (offgoing nurse). Report included the following information SBAR, Kardex, ED Summary, Intake/Output, MAR and Cardiac Rhythm NSR/sinus tach. Problem: Falls - Risk of  Goal: *Absence of Falls  Description: Document Belén Locket Fall Risk and appropriate interventions in the flowsheet. Outcome: Progressing Towards Goal  Note: Fall Risk Interventions:            Medication Interventions: Assess postural VS orthostatic hypotension, Evaluate medications/consider consulting pharmacy                   Problem: Airway Clearance - Ineffective  Goal: Achieve or maintain patent airway  Outcome: Progressing Towards Goal     Problem: Gas Exchange - Impaired  Goal: Absence of hypoxia  Outcome: Progressing Towards Goal  Goal: Promote optimal lung function  Outcome: Progressing Towards Goal     Problem: Breathing Pattern - Ineffective  Goal: Ability to achieve and maintain a regular respiratory rate  Outcome: Progressing Towards Goal     Problem:  Body Temperature -  Risk of, Imbalanced  Goal: Ability to maintain a body temperature within defined limits  Outcome: Progressing Towards Goal     Problem: Risk for Fluid Volume Deficit  Goal: Maintain normal heart rhythm  Outcome: Progressing Towards Goal     Problem: Fatigue  Goal: Verbalize increase energy and improved vitality  Outcome: Progressing Towards Goal

## 2021-07-14 NOTE — PROGRESS NOTES
Admission Medication Reconciliation:    Information obtained from:  Patient over the phone  RxQuery data available¹:  YES    Comments/Recommendations: Updated PTA meds/reviewed patient's allergies. 1)  Information obtained from patient over the phone; questionable historian- she reports she only takes medication for \"high blood pressure and cholesterol\"; she denies taking any medication for diabetes despite outpatient records showing that metformin and glipizide are prescribed    2)  Medication changes (since last review): Added  - norvasc/benazepril  -metoprolol  -crestor         [employer] pharmacy benefit data reflects medications filled and processed through the patient's insurance, however   this data does NOT capture whether the medication was picked up or is currently being taken by the patient. Allergies:  Patient has no known allergies. Significant PMH/Disease States: No past medical history on file. Chief Complaint for this Admission:    Chief Complaint   Patient presents with    Shortness of Breath     Prior to Admission Medications:   Prior to Admission Medications   Prescriptions Last Dose Informant Taking? amLODIPine-benazepril (LOTREL) 10-40 mg per capsule   Yes   Sig: Take 1 Capsule by mouth daily. metoprolol tartrate (LOPRESSOR) 50 mg tablet   Yes   Sig: Take 50 mg by mouth two (2) times a day. rosuvastatin (Crestor) 40 mg tablet   Yes   Sig: Take 40 mg by mouth nightly. Facility-Administered Medications: None     Please contact the main inpatient pharmacy with any questions or concerns at [phone] and we will direct you to the clinical pharmacist covering this patient's care while in-house.    Michel London, BRED

## 2021-07-15 ENCOUNTER — APPOINTMENT (OUTPATIENT)
Dept: NON INVASIVE DIAGNOSTICS | Age: 71
DRG: 177 | End: 2021-07-15
Attending: FAMILY MEDICINE
Payer: MEDICARE

## 2021-07-15 ENCOUNTER — APPOINTMENT (OUTPATIENT)
Dept: GENERAL RADIOLOGY | Age: 71
DRG: 177 | End: 2021-07-15
Attending: HOSPITALIST
Payer: MEDICARE

## 2021-07-15 LAB
BASOPHILS # BLD: 0 K/UL (ref 0–0.1)
BASOPHILS NFR BLD: 0 % (ref 0–1)
CRP SERPL-MCNC: 5.97 MG/DL (ref 0–0.6)
D DIMER PPP FEU-MCNC: 0.65 MG/L FEU (ref 0–0.65)
DIFFERENTIAL METHOD BLD: NORMAL
EOSINOPHIL # BLD: 0 K/UL (ref 0–0.4)
EOSINOPHIL NFR BLD: 0 % (ref 0–7)
ERYTHROCYTE [DISTWIDTH] IN BLOOD BY AUTOMATED COUNT: 14.4 % (ref 11.5–14.5)
EST. AVERAGE GLUCOSE BLD GHB EST-MCNC: 249 MG/DL
GLUCOSE BLD STRIP.AUTO-MCNC: 319 MG/DL (ref 65–117)
GLUCOSE BLD STRIP.AUTO-MCNC: 323 MG/DL (ref 65–117)
GLUCOSE BLD STRIP.AUTO-MCNC: 340 MG/DL (ref 65–117)
GLUCOSE BLD STRIP.AUTO-MCNC: 342 MG/DL (ref 65–117)
HBA1C MFR BLD: 10.3 % (ref 4–5.6)
HCT VFR BLD AUTO: 36.1 % (ref 35–47)
HGB BLD-MCNC: 11.9 G/DL (ref 11.5–16)
IMM GRANULOCYTES # BLD AUTO: 0 K/UL
IMM GRANULOCYTES NFR BLD AUTO: 0 %
LYMPHOCYTES # BLD: 1 K/UL (ref 0.8–3.5)
LYMPHOCYTES NFR BLD: 17 % (ref 12–49)
MCH RBC QN AUTO: 28 PG (ref 26–34)
MCHC RBC AUTO-ENTMCNC: 33 G/DL (ref 30–36.5)
MCV RBC AUTO: 84.9 FL (ref 80–99)
MONOCYTES # BLD: 0.3 K/UL (ref 0–1)
MONOCYTES NFR BLD: 5 % (ref 5–13)
NEUTS BAND NFR BLD MANUAL: 4 % (ref 0–6)
NEUTS SEG # BLD: 4.8 K/UL (ref 1.8–8)
NEUTS SEG NFR BLD: 74 % (ref 32–75)
NRBC # BLD: 0 K/UL (ref 0–0.01)
NRBC BLD-RTO: 0 PER 100 WBC
PLATELET # BLD AUTO: 315 K/UL (ref 150–400)
PMV BLD AUTO: 11.7 FL (ref 8.9–12.9)
PROCALCITONIN SERPL-MCNC: <0.05 NG/ML
RBC # BLD AUTO: 4.25 M/UL (ref 3.8–5.2)
RBC MORPH BLD: NORMAL
SERVICE CMNT-IMP: ABNORMAL
WBC # BLD AUTO: 6.1 K/UL (ref 3.6–11)

## 2021-07-15 PROCEDURE — 85379 FIBRIN DEGRADATION QUANT: CPT

## 2021-07-15 PROCEDURE — 02HV33Z INSERTION OF INFUSION DEVICE INTO SUPERIOR VENA CAVA, PERCUTANEOUS APPROACH: ICD-10-PCS | Performed by: HOSPITALIST

## 2021-07-15 PROCEDURE — 74011636637 HC RX REV CODE- 636/637: Performed by: STUDENT IN AN ORGANIZED HEALTH CARE EDUCATION/TRAINING PROGRAM

## 2021-07-15 PROCEDURE — 74011250637 HC RX REV CODE- 250/637: Performed by: STUDENT IN AN ORGANIZED HEALTH CARE EDUCATION/TRAINING PROGRAM

## 2021-07-15 PROCEDURE — 74011000258 HC RX REV CODE- 258: Performed by: FAMILY MEDICINE

## 2021-07-15 PROCEDURE — 77030020365 HC SOL INJ SOD CL 0.9% 50ML

## 2021-07-15 PROCEDURE — 76937 US GUIDE VASCULAR ACCESS: CPT

## 2021-07-15 PROCEDURE — 74011636637 HC RX REV CODE- 636/637: Performed by: HOSPITALIST

## 2021-07-15 PROCEDURE — 83036 HEMOGLOBIN GLYCOSYLATED A1C: CPT

## 2021-07-15 PROCEDURE — C1751 CATH, INF, PER/CENT/MIDLINE: HCPCS

## 2021-07-15 PROCEDURE — 36573 INSJ PICC RS&I 5 YR+: CPT | Performed by: HOSPITALIST

## 2021-07-15 PROCEDURE — 74011250637 HC RX REV CODE- 250/637: Performed by: FAMILY MEDICINE

## 2021-07-15 PROCEDURE — 36415 COLL VENOUS BLD VENIPUNCTURE: CPT

## 2021-07-15 PROCEDURE — 94760 N-INVAS EAR/PLS OXIMETRY 1: CPT

## 2021-07-15 PROCEDURE — 65660000000 HC RM CCU STEPDOWN

## 2021-07-15 PROCEDURE — 86140 C-REACTIVE PROTEIN: CPT

## 2021-07-15 PROCEDURE — 71045 X-RAY EXAM CHEST 1 VIEW: CPT

## 2021-07-15 PROCEDURE — 77010033711 HC HIGH FLOW OXYGEN

## 2021-07-15 PROCEDURE — 77010033678 HC OXYGEN DAILY

## 2021-07-15 PROCEDURE — 85025 COMPLETE CBC W/AUTO DIFF WBC: CPT

## 2021-07-15 PROCEDURE — 74011250636 HC RX REV CODE- 250/636: Performed by: FAMILY MEDICINE

## 2021-07-15 PROCEDURE — 84145 PROCALCITONIN (PCT): CPT

## 2021-07-15 PROCEDURE — 82962 GLUCOSE BLOOD TEST: CPT

## 2021-07-15 PROCEDURE — 74011000250 HC RX REV CODE- 250: Performed by: FAMILY MEDICINE

## 2021-07-15 PROCEDURE — 74011250636 HC RX REV CODE- 250/636: Performed by: STUDENT IN AN ORGANIZED HEALTH CARE EDUCATION/TRAINING PROGRAM

## 2021-07-15 RX ORDER — INSULIN GLARGINE 100 [IU]/ML
20 INJECTION, SOLUTION SUBCUTANEOUS
Status: DISCONTINUED | OUTPATIENT
Start: 2021-07-15 | End: 2021-07-19

## 2021-07-15 RX ADMIN — INSULIN LISPRO 4 UNITS: 100 INJECTION, SOLUTION INTRAVENOUS; SUBCUTANEOUS at 22:56

## 2021-07-15 RX ADMIN — INSULIN LISPRO 7 UNITS: 100 INJECTION, SOLUTION INTRAVENOUS; SUBCUTANEOUS at 13:05

## 2021-07-15 RX ADMIN — METOPROLOL TARTRATE 50 MG: 50 TABLET, FILM COATED ORAL at 09:08

## 2021-07-15 RX ADMIN — Medication 10 ML: at 16:00

## 2021-07-15 RX ADMIN — INSULIN LISPRO 7 UNITS: 100 INJECTION, SOLUTION INTRAVENOUS; SUBCUTANEOUS at 19:27

## 2021-07-15 RX ADMIN — ENOXAPARIN SODIUM 40 MG: 40 INJECTION SUBCUTANEOUS at 06:52

## 2021-07-15 RX ADMIN — ASPIRIN 81 MG: 81 TABLET, CHEWABLE ORAL at 09:08

## 2021-07-15 RX ADMIN — Medication 10 ML: at 06:52

## 2021-07-15 RX ADMIN — METOPROLOL TARTRATE 50 MG: 50 TABLET, FILM COATED ORAL at 21:28

## 2021-07-15 RX ADMIN — OXYCODONE HYDROCHLORIDE AND ACETAMINOPHEN 500 MG: 500 TABLET ORAL at 09:08

## 2021-07-15 RX ADMIN — ENOXAPARIN SODIUM 40 MG: 40 INJECTION SUBCUTANEOUS at 19:27

## 2021-07-15 RX ADMIN — Medication 1 CAPSULE: at 09:08

## 2021-07-15 RX ADMIN — REMDESIVIR 100 MG: 5 INJECTION INTRAVENOUS at 19:29

## 2021-07-15 RX ADMIN — CEFTRIAXONE SODIUM 1 G: 1 INJECTION, POWDER, FOR SOLUTION INTRAMUSCULAR; INTRAVENOUS at 16:00

## 2021-07-15 RX ADMIN — INSULIN GLARGINE 20 UNITS: 100 INJECTION, SOLUTION SUBCUTANEOUS at 21:28

## 2021-07-15 RX ADMIN — INSULIN LISPRO 7 UNITS: 100 INJECTION, SOLUTION INTRAVENOUS; SUBCUTANEOUS at 09:08

## 2021-07-15 RX ADMIN — Medication 10 ML: at 21:30

## 2021-07-15 RX ADMIN — DEXAMETHASONE SODIUM PHOSPHATE 6 MG: 10 INJECTION, SOLUTION INTRAMUSCULAR; INTRAVENOUS at 15:58

## 2021-07-15 RX ADMIN — AZITHROMYCIN MONOHYDRATE 500 MG: 500 INJECTION, POWDER, LYOPHILIZED, FOR SOLUTION INTRAVENOUS at 16:00

## 2021-07-15 RX ADMIN — OXYCODONE HYDROCHLORIDE AND ACETAMINOPHEN 500 MG: 500 TABLET ORAL at 19:27

## 2021-07-15 RX ADMIN — AMLODIPINE BESYLATE 10 MG: 5 TABLET ORAL at 09:08

## 2021-07-15 NOTE — PROCEDURES
Order for PICC received and verified. Consent obtained from pt after risks, benefits, procedure explained and questions answered. PICC Placement Note    PRE-PROCEDURE VERIFICATION  Correct Procedure: yes  Correct Site:  yes  Temperature: Temp: 97.8 °F (36.6 °C), Temperature Source: Temp Source: Oral  Recent Labs     07/13/21  1031   BUN 16   CREA 1.26*      WBC 5.2     Allergies: Patient has no known allergies. Education materials, including PICC Booklet, for PICC Care given to patient: yes. See Patient Education activity for further details. PROCEDURE DETAIL  A triple lumen PICC line was started for vascular access. The following documentation is in addition to the PICC properties in the lines/airways flowsheet :  Lot #: [id number]  Was xylocaine 1% used intradermally:  yes  Total catheter length: 36 (cm)  External catheter length:  0 (cm)  Vein Selection for PICC:right basilic  Central Line Bundle followed yes  Complication Related to Insertion: none    The placement was verified by CXR:  The  tip location is in the distal superior vena cava. See CXR results for PICC tip placement. Report given to nurse Rona Chatman is okay to use.     James Mishra RN

## 2021-07-15 NOTE — PROGRESS NOTES
Pulmonary    On high flow O2  Received actemra yesterday  Remains on abx, decadron, remdesivir    Patient Vitals for the past 4 hrs:   BP Temp Pulse Resp SpO2   07/15/21 0656 (!) 152/75 97.8 °F (36.6 °C) 93 26 95 %   Temp (24hrs), Av °F (36.7 °C), Min:97.8 °F (36.6 °C), Max:98.2 °F (36.8 °C)      Intake/Output Summary (Last 24 hours) at 7/15/2021 07  Last data filed at 2021 1100  Gross per 24 hour   Intake    Output 700 ml   Net -700 ml     Lab:  Recent Labs     21  1542 21  1056 21  1031   WBC  --   --  5.2   HGB  --   --  12.0   PLT  --   --  256   NA  --   --  134*   K  --   --  4.1   CL  --   --  101   CO2  --   --  23   BUN  --   --  16   CREA  --   --  1.26*   GLU  --   --  342*   CA  --   --  9.1   TROIQ <0.05  --   --    TBILI  --   --  0.6   LAC  --  1.4  --        Pulmonary Impression / Recommendations:    Acute hypoxemic resp failure 2/2 COVID 19 pneumonia.   At high risk for deterioration    --continue O2 and supportive care  --on abx, remdesivir, decadron, and lovenox       Marsha Sanchez MD

## 2021-07-15 NOTE — PROGRESS NOTES
Problem: Gas Exchange - Impaired  Goal: Absence of hypoxia  Outcome: Progressing Towards Goal  Goal: Promote optimal lung function  Outcome: Progressing Towards Goal     Problem: Isolation Precautions - Risk of Spread of Infection  Goal: Prevent transmission of infectious organism to others  Outcome: Progressing Towards Goal

## 2021-07-15 NOTE — PROGRESS NOTES
Bedside shift change report given to Hamida Salcido RN (oncoming nurse) by Ion Díaz RN (offgoing nurse). Report included the following information SBAR, Kardex, ED Summary, Intake/Output and Cardiac Rhythm NSR/tach. Problem: Falls - Risk of  Goal: *Absence of Falls  Description: Document Han Jernigan Fall Risk and appropriate interventions in the flowsheet. Outcome: Progressing Towards Goal  Note: Fall Risk Interventions:  Mobility Interventions: Communicate number of staff needed for ambulation/transfer, Patient to call before getting OOB         Medication Interventions: Patient to call before getting OOB    Elimination Interventions: Patient to call for help with toileting needs              Problem: Airway Clearance - Ineffective  Goal: Achieve or maintain patent airway  Outcome: Progressing Towards Goal     Problem: Gas Exchange - Impaired  Goal: Absence of hypoxia  Outcome: Progressing Towards Goal  Goal: Promote optimal lung function  Outcome: Progressing Towards Goal     Problem: Breathing Pattern - Ineffective  Goal: Ability to achieve and maintain a regular respiratory rate  Outcome: Progressing Towards Goal     Problem:  Body Temperature -  Risk of, Imbalanced  Goal: Ability to maintain a body temperature within defined limits  Outcome: Progressing Towards Goal  Goal: Will regain or maintain usual level of consciousness  Outcome: Progressing Towards Goal  Goal: Complications related to the disease process, condition or treatment will be avoided or minimized  Outcome: Progressing Towards Goal     Problem: Isolation Precautions - Risk of Spread of Infection  Goal: Prevent transmission of infectious organism to others  Outcome: Progressing Towards Goal     Problem: Nutrition Deficits  Goal: Optimize nutrtional status  Outcome: Progressing Towards Goal     Problem: Risk for Fluid Volume Deficit  Goal: Maintain normal heart rhythm  Outcome: Progressing Towards Goal  Goal: Maintain absence of muscle cramping  Outcome: Progressing Towards Goal  Goal: Maintain normal serum potassium, sodium, calcium, phosphorus, and pH  Outcome: Progressing Towards Goal     Problem: Loneliness or Risk for Loneliness  Goal: Demonstrate positive use of time alone when socialization is not possible  Outcome: Progressing Towards Goal     Problem: Fatigue  Goal: Verbalize increase energy and improved vitality  Outcome: Progressing Towards Goal

## 2021-07-15 NOTE — PROGRESS NOTES
Bedside shift change report given to LEDY Mclaughlin (oncoming nurse) by Srinivas Eisenberg (offgoing nurse). Report included the following information SBAR, Kardex, ED Summary, Procedure Summary, MAR and Recent Results.

## 2021-07-16 LAB
ALBUMIN SERPL-MCNC: 3 G/DL (ref 3.5–5)
ALBUMIN SERPL-MCNC: 3 G/DL (ref 3.5–5)
ALBUMIN/GLOB SERPL: 0.6 {RATIO} (ref 1.1–2.2)
ALBUMIN/GLOB SERPL: 0.7 {RATIO} (ref 1.1–2.2)
ALP SERPL-CCNC: 63 U/L (ref 45–117)
ALP SERPL-CCNC: 64 U/L (ref 45–117)
ALT SERPL-CCNC: 16 U/L (ref 12–78)
ALT SERPL-CCNC: 18 U/L (ref 12–78)
ANION GAP SERPL CALC-SCNC: 9 MMOL/L (ref 5–15)
ARTERIAL PATENCY WRIST A: POSITIVE
AST SERPL-CCNC: 22 U/L (ref 15–37)
AST SERPL-CCNC: 23 U/L (ref 15–37)
BASE DEFICIT BLD-SCNC: 2.5 MMOL/L
BASOPHILS # BLD: 0 K/UL (ref 0–0.1)
BASOPHILS NFR BLD: 0 % (ref 0–1)
BDY SITE: ABNORMAL
BILIRUB DIRECT SERPL-MCNC: <0.1 MG/DL (ref 0–0.2)
BILIRUB SERPL-MCNC: 0.3 MG/DL (ref 0.2–1)
BILIRUB SERPL-MCNC: 0.3 MG/DL (ref 0.2–1)
BUN SERPL-MCNC: 20 MG/DL (ref 6–20)
BUN/CREAT SERPL: 21 (ref 12–20)
CALCIUM SERPL-MCNC: 9.2 MG/DL (ref 8.5–10.1)
CHLORIDE SERPL-SCNC: 110 MMOL/L (ref 97–108)
CO2 SERPL-SCNC: 22 MMOL/L (ref 21–32)
CREAT SERPL-MCNC: 0.97 MG/DL (ref 0.55–1.02)
DIFFERENTIAL METHOD BLD: ABNORMAL
EOSINOPHIL # BLD: 0 K/UL (ref 0–0.4)
EOSINOPHIL NFR BLD: 0 % (ref 0–7)
ERYTHROCYTE [DISTWIDTH] IN BLOOD BY AUTOMATED COUNT: 14.2 % (ref 11.5–14.5)
GAS FLOW.O2 O2 DELIVERY SYS: ABNORMAL L/MIN
GLOBULIN SER CALC-MCNC: 4.6 G/DL (ref 2–4)
GLOBULIN SER CALC-MCNC: 4.7 G/DL (ref 2–4)
GLUCOSE BLD STRIP.AUTO-MCNC: 238 MG/DL (ref 65–117)
GLUCOSE BLD STRIP.AUTO-MCNC: 256 MG/DL (ref 65–117)
GLUCOSE BLD STRIP.AUTO-MCNC: 268 MG/DL (ref 65–117)
GLUCOSE BLD STRIP.AUTO-MCNC: 271 MG/DL (ref 65–117)
GLUCOSE SERPL-MCNC: 269 MG/DL (ref 65–100)
HCO3 BLD-SCNC: 20.1 MMOL/L (ref 22–26)
HCT VFR BLD AUTO: 35.5 % (ref 35–47)
HGB BLD-MCNC: 11.1 G/DL (ref 11.5–16)
IMM GRANULOCYTES # BLD AUTO: 0 K/UL
IMM GRANULOCYTES NFR BLD AUTO: 0 %
LYMPHOCYTES # BLD: 0.4 K/UL (ref 0.8–3.5)
LYMPHOCYTES NFR BLD: 7 % (ref 12–49)
MCH RBC QN AUTO: 26.8 PG (ref 26–34)
MCHC RBC AUTO-ENTMCNC: 31.3 G/DL (ref 30–36.5)
MCV RBC AUTO: 85.7 FL (ref 80–99)
MONOCYTES # BLD: 0.3 K/UL (ref 0–1)
MONOCYTES NFR BLD: 4 % (ref 5–13)
NEUTS SEG # BLD: 5.6 K/UL (ref 1.8–8)
NEUTS SEG NFR BLD: 89 % (ref 32–75)
NRBC # BLD: 0 K/UL (ref 0–0.01)
NRBC BLD-RTO: 0 PER 100 WBC
O2/TOTAL GAS SETTING VFR VENT: 94 %
PCO2 BLD: 28.2 MMHG (ref 35–45)
PH BLD: 7.46 [PH] (ref 7.35–7.45)
PLATELET # BLD AUTO: 361 K/UL (ref 150–400)
PMV BLD AUTO: 12.2 FL (ref 8.9–12.9)
PO2 BLD: 60 MMHG (ref 80–100)
POTASSIUM SERPL-SCNC: 4.3 MMOL/L (ref 3.5–5.1)
PROT SERPL-MCNC: 7.6 G/DL (ref 6.4–8.2)
PROT SERPL-MCNC: 7.7 G/DL (ref 6.4–8.2)
RBC # BLD AUTO: 4.14 M/UL (ref 3.8–5.2)
RBC MORPH BLD: ABNORMAL
SAO2 % BLD: 92.5 % (ref 92–97)
SERVICE CMNT-IMP: ABNORMAL
SODIUM SERPL-SCNC: 141 MMOL/L (ref 136–145)
SPECIMEN TYPE: ABNORMAL
WBC # BLD AUTO: 6.3 K/UL (ref 3.6–11)

## 2021-07-16 PROCEDURE — 77010033678 HC OXYGEN DAILY

## 2021-07-16 PROCEDURE — 74011250637 HC RX REV CODE- 250/637: Performed by: STUDENT IN AN ORGANIZED HEALTH CARE EDUCATION/TRAINING PROGRAM

## 2021-07-16 PROCEDURE — 74011636637 HC RX REV CODE- 636/637: Performed by: HOSPITALIST

## 2021-07-16 PROCEDURE — 36600 WITHDRAWAL OF ARTERIAL BLOOD: CPT

## 2021-07-16 PROCEDURE — 36415 COLL VENOUS BLD VENIPUNCTURE: CPT

## 2021-07-16 PROCEDURE — 74011250637 HC RX REV CODE- 250/637: Performed by: HOSPITALIST

## 2021-07-16 PROCEDURE — 85025 COMPLETE CBC W/AUTO DIFF WBC: CPT

## 2021-07-16 PROCEDURE — 82803 BLOOD GASES ANY COMBINATION: CPT

## 2021-07-16 PROCEDURE — 74011250636 HC RX REV CODE- 250/636: Performed by: NURSE PRACTITIONER

## 2021-07-16 PROCEDURE — 74011250636 HC RX REV CODE- 250/636: Performed by: HOSPITALIST

## 2021-07-16 PROCEDURE — 74011250636 HC RX REV CODE- 250/636: Performed by: STUDENT IN AN ORGANIZED HEALTH CARE EDUCATION/TRAINING PROGRAM

## 2021-07-16 PROCEDURE — 74011250636 HC RX REV CODE- 250/636: Performed by: FAMILY MEDICINE

## 2021-07-16 PROCEDURE — 80076 HEPATIC FUNCTION PANEL: CPT

## 2021-07-16 PROCEDURE — 74011250637 HC RX REV CODE- 250/637: Performed by: FAMILY MEDICINE

## 2021-07-16 PROCEDURE — 74011000258 HC RX REV CODE- 258: Performed by: FAMILY MEDICINE

## 2021-07-16 PROCEDURE — 82962 GLUCOSE BLOOD TEST: CPT

## 2021-07-16 PROCEDURE — 74011000250 HC RX REV CODE- 250: Performed by: FAMILY MEDICINE

## 2021-07-16 PROCEDURE — 65660000000 HC RM CCU STEPDOWN

## 2021-07-16 PROCEDURE — 80053 COMPREHEN METABOLIC PANEL: CPT

## 2021-07-16 RX ORDER — INSULIN LISPRO 100 [IU]/ML
6 INJECTION, SOLUTION INTRAVENOUS; SUBCUTANEOUS
Status: DISCONTINUED | OUTPATIENT
Start: 2021-07-16 | End: 2021-07-20

## 2021-07-16 RX ORDER — FUROSEMIDE 10 MG/ML
40 INJECTION INTRAMUSCULAR; INTRAVENOUS DAILY
Status: DISCONTINUED | OUTPATIENT
Start: 2021-07-16 | End: 2021-07-23 | Stop reason: HOSPADM

## 2021-07-16 RX ADMIN — AMLODIPINE BESYLATE 10 MG: 5 TABLET ORAL at 09:15

## 2021-07-16 RX ADMIN — ASPIRIN 81 MG: 81 TABLET, CHEWABLE ORAL at 09:15

## 2021-07-16 RX ADMIN — INSULIN GLARGINE 20 UNITS: 100 INJECTION, SOLUTION SUBCUTANEOUS at 22:07

## 2021-07-16 RX ADMIN — ENOXAPARIN SODIUM 40 MG: 40 INJECTION SUBCUTANEOUS at 17:40

## 2021-07-16 RX ADMIN — CEFTRIAXONE SODIUM 1 G: 1 INJECTION, POWDER, FOR SOLUTION INTRAMUSCULAR; INTRAVENOUS at 11:52

## 2021-07-16 RX ADMIN — HYDRALAZINE HYDROCHLORIDE 10 MG: 20 INJECTION INTRAMUSCULAR; INTRAVENOUS at 07:15

## 2021-07-16 RX ADMIN — AZITHROMYCIN MONOHYDRATE 500 MG: 500 INJECTION, POWDER, LYOPHILIZED, FOR SOLUTION INTRAVENOUS at 11:54

## 2021-07-16 RX ADMIN — Medication 10 ML: at 13:23

## 2021-07-16 RX ADMIN — INSULIN LISPRO 5 UNITS: 100 INJECTION, SOLUTION INTRAVENOUS; SUBCUTANEOUS at 12:08

## 2021-07-16 RX ADMIN — Medication 10 ML: at 06:00

## 2021-07-16 RX ADMIN — DEXAMETHASONE SODIUM PHOSPHATE 6 MG: 10 INJECTION, SOLUTION INTRAMUSCULAR; INTRAVENOUS at 11:53

## 2021-07-16 RX ADMIN — REMDESIVIR 100 MG: 5 INJECTION INTRAVENOUS at 13:24

## 2021-07-16 RX ADMIN — INSULIN LISPRO 7 UNITS: 100 INJECTION, SOLUTION INTRAVENOUS; SUBCUTANEOUS at 09:15

## 2021-07-16 RX ADMIN — Medication 10 ML: at 22:07

## 2021-07-16 RX ADMIN — INSULIN LISPRO 3 UNITS: 100 INJECTION, SOLUTION INTRAVENOUS; SUBCUTANEOUS at 22:07

## 2021-07-16 RX ADMIN — INSULIN LISPRO 3 UNITS: 100 INJECTION, SOLUTION INTRAVENOUS; SUBCUTANEOUS at 17:40

## 2021-07-16 RX ADMIN — OXYCODONE HYDROCHLORIDE AND ACETAMINOPHEN 500 MG: 500 TABLET ORAL at 09:15

## 2021-07-16 RX ADMIN — Medication 1 CAPSULE: at 09:15

## 2021-07-16 RX ADMIN — Medication 1 CAPSULE: at 11:53

## 2021-07-16 RX ADMIN — METOPROLOL TARTRATE 50 MG: 50 TABLET, FILM COATED ORAL at 09:15

## 2021-07-16 RX ADMIN — METOPROLOL TARTRATE 50 MG: 50 TABLET, FILM COATED ORAL at 22:07

## 2021-07-16 RX ADMIN — OXYCODONE HYDROCHLORIDE AND ACETAMINOPHEN 500 MG: 500 TABLET ORAL at 17:41

## 2021-07-16 RX ADMIN — FUROSEMIDE 40 MG: 10 INJECTION, SOLUTION INTRAMUSCULAR; INTRAVENOUS at 11:53

## 2021-07-16 RX ADMIN — INSULIN LISPRO 6 UNITS: 100 INJECTION, SOLUTION INTRAVENOUS; SUBCUTANEOUS at 17:41

## 2021-07-16 NOTE — PROGRESS NOTES
PCCM:    VSS, on HFNC    D dimer 0.65    Plan:    covid19   remdesivir   Decadron   No need for empiric abx   O2 titration above 90%   Prn over the weekend    Hernán Oscar PA-C

## 2021-07-16 NOTE — PROGRESS NOTES
Bedside shift change report given to Maverick Aguirre RN  (oncoming nurse) by Tiesha Rossi RN  (offgoing nurse). Report included the following information SBAR, Intake/Output, MAR and Cardiac Rhythm NSR - ST.     0800: Patient alert and oriented x 4, requireiering frequent reminders to call for help before getting up. Bed alarm placed and activated. Pt periodically  SOB this morning RR in upper 20, on 18 L  FIO2 94%, discussed with patient the need to check her ABG to which patient innitially agreed and later refused when RT came to the room. Diarrhea x 2 output today, O2 with minimal activity drops to 70%s, while patient sleeps she saturates in upper 90s% - 100%, titrated oxygen down to 15L, FIO2 94%. In the evening hours patient agreed to have ABG drawn. Bedside shift change report given to [employer]  (oncoming nurse) by Maverick Aguirre RN  (offgoing nurse). Report included the following information SBAR, Intake/Output, MAR and Cardiac Rhythm NSR.

## 2021-07-16 NOTE — PROGRESS NOTES
[facility] [facility]  Hospitalist Group                                                                                          Hospitalist Progress Note  Marty Nelson MD  Answering service: [phone] from in house phone        Date of Service:  2021  NAME:  Oumou Ferro  :  1950  MRN:  [id number]    This documentation was facilitated by a Voice Recognition software and may contain inadvertent typographical errors. Admission Summary:   Patient presented to the ED on  with shortness of breath and fever and she was found to have Covid pneumonia. She is not vaccinated    Interval history / Subjective:        Patient's seen and examined. Overnight events noted, patient was said to be confused. She is currently calm, alert and oriented. Assessment & Plan:   Acute hypoxic respiratory failure  D/t SARS-CoV-2 infection   CTA no PE but findings consistent with atypical viral pneumonia  C/w Remdesivir, dexamethasone  Acterma x 1 dose. C/w emperic Abx  Follow inflammatory markers  DVT prophylaxis per Covid protocol  On oxygen via high flow nasal cannula  Pulmonary following. Congested: DC IV fluids, Lasix 40 mg IV daily    DM with hyperglycemia precipitated by steroid and acute illness  She mentions not taking any meds currently  Increase Lantus to 20 units nightly. Humalog sliding scale coverage. Add Premeal Humalog 6 units AC 3 times daily     Elevated creatinine: Normalized  --Unknown baseline.       Uncontrolled HTN:  C/w Metoprolol, amlodipine  Hydralazine prn    Access: PICC line placed on 7/15     DVT ppx: Lovenox per Covid protocol  CODE STATUS: She wishes to be full code  Isolation: Droplet plus     Morbid obesity  Estimated discharge date: Greater than 72 hours      Hospital Problems  Never Reviewed        Codes Class Noted POA    COVID-19 ICD-10-CM: U07.1  ICD-9-CM: 079.89  2021 Unknown                Review of Systems:   A comprehensive review of systems was negative except for that written in the HPI. Vital Signs:    Last 24hrs VS reviewed since prior progress note. Most recent are:  Visit Vitals  BP (!) 146/67 (BP 1 Location: Left upper arm, BP Patient Position: At rest)   Pulse 87   Temp 98.5 °F (36.9 °C)   Resp 29   Ht 5' 5\" (1.651 m)   Wt 92.1 kg (203 lb)   SpO2 95%   BMI 33.78 kg/m²         Intake/Output Summary (Last 24 hours) at 7/16/2021 1656  Last data filed at 7/16/2021 1509  Gross per 24 hour   Intake 840 ml   Output 1200 ml   Net -360 ml        Physical Examination:     I had a face to face encounter with this patient and independently examined them on7/16/2021 as outlined below:        Constitutional:  Alert oriented, not in distress    HEENT:  Atraumatic. Oral mucosa moist,. Non icteric sclera. No pallor. Resp:  On oxygen via high flow nasal cannula: Symmetrical air entry. Chest Wall: No deformity   CV:  Regular rhythm, normal rate, no murmurs, gallops, rubs    GI:  Soft, non distended, non tender. normoactive bowel sounds, no hepatosplenomegaly    :  No CVA or suprapubic tenderness    Musculoskeletal:  No edema, warm, 2+ pulses throughout    Neurologic:  Mental status:AAOx3,   Cranial nerves II-XII : WNL  Motor exam:Moves all extremities symmetrically              Data Review:    Review and/or order of clinical lab test  Review and/or order of tests in the radiology section of CPT  Review and/or order of tests in the medicine section of CPT      Labs:     Recent Labs     07/16/21  0352 07/15/21  1635   WBC 6.3 6.1   HGB 11.1* 11.9   HCT 35.5 36.1    315     Recent Labs     07/16/21  0354      K 4.3   *   CO2 22   BUN 20   CREA 0.97   *   CA 9.2     Recent Labs     07/16/21  0354 07/16/21  0352   ALT 18 16   AP 63 64   TBILI 0.3 0.3   TP 7.6 7.7   ALB 3.0* 3.0*   GLOB 4.6* 4.7*     No results for input(s): INR, PTP, APTT, INREXT, INREXT in the last 72 hours.    No results for input(s): FE, TIBC, PSAT, FERR in the last 72 hours. No results found for: FOL, RBCF   Recent Labs     07/13/21  2132   PH 7.41   PCO2 31*   PO2 64*     No results for input(s): CPK, CKNDX, TROIQ in the last 72 hours.     No lab exists for component: CPKMB  No results found for: CHOL, CHOLX, CHLST, CHOLV, HDL, HDLP, LDL, LDLC, DLDLP, TGLX, TRIGL, TRIGP, CHHD, CHHDX  Lab Results   Component Value Date/Time    Glucose (POC) 256 (H) 07/16/2021 12:04 PM    Glucose (POC) 271 (H) 07/16/2021 08:21 AM    Glucose (POC) 340 (H) 07/15/2021 09:27 PM    Glucose (POC) 319 (H) 07/15/2021 06:09 PM    Glucose (POC) 323 (H) 07/15/2021 11:38 AM     No results found for: COLOR, APPRN, SPGRU, REFSG, KEE, PROTU, GLUCU, KETU, BILU, UROU, GAUTAM, LEUKU, GLUKE, EPSU, BACTU, WBCU, RBCU, CASTS, UCRY      Medications Reviewed:     Current Facility-Administered Medications   Medication Dose Route Frequency    furosemide (LASIX) injection 40 mg  40 mg IntraVENous DAILY    L.acidophilus-paracasei-S.thermophil-bifidobacter (RISAQUAD) 8 billion cell capsule  1 Capsule Oral DAILY    insulin glargine (LANTUS) injection 20 Units  20 Units SubCUTAneous QHS    enoxaparin (LOVENOX) injection 40 mg  40 mg SubCUTAneous Q12H    insulin lispro (HUMALOG)   SubCUTAneous AC&HS    glucagon (GLUCAGEN) injection 1 mg  1 mg IntraMUSCular PRN    dextrose (D50W) injection syrg 12.5 g  25 mL IntraVENous PRN    glucose chewable tablet 12 g  3 Tablet Oral PRN    amLODIPine (NORVASC) tablet 10 mg  10 mg Oral DAILY    metoprolol tartrate (LOPRESSOR) tablet 50 mg  50 mg Oral Q12H    dexamethasone (PF) (DECADRON) 10 mg/mL injection 6 mg  6 mg IntraVENous Q24H    sodium chloride (NS) flush 5-40 mL  5-40 mL IntraVENous Q8H    sodium chloride (NS) flush 5-40 mL  5-40 mL IntraVENous PRN    cefTRIAXone (ROCEPHIN) 1 g in 0.9% sodium chloride (MBP/ADV) 50 mL MBP  1 g IntraVENous Q24H    azithromycin (ZITHROMAX) 500 mg in 0.9% sodium chloride 250 mL (VIAL-MATE)  500 mg IntraVENous Q24H    acetaminophen (TYLENOL) tablet 650 mg  650 mg Oral Q4H PRN    zinc sulfate (ZINCATE) 50 mg zinc (220 mg) capsule 1 Capsule  1 Capsule Oral DAILY    ascorbic acid (vitamin C) (VITAMIN C) tablet 500 mg  500 mg Oral BID    remdesivir 100 mg in 0.9% sodium chloride 250 mL IVPB  100 mg IntraVENous Q24H    aspirin chewable tablet 81 mg  81 mg Oral DAILY    hydrALAZINE (APRESOLINE) 20 mg/mL injection 10 mg  10 mg IntraVENous Q4H PRN     ______________________________________________________________________  EXPECTED LENGTH OF STAY: 5d 9h  ACTUAL LENGTH OF STAY:          3                 Aileen Newman MD

## 2021-07-16 NOTE — PROGRESS NOTES
Overnight Hospitalist Progress Note    Name: Barbara Solorzano  YOB: 1950  MRN: [id number]  Admission Date: 7/13/2021    Date of service: 4:40 AM    ____________________________________________________________________________    As per patient's primary nurse to evaluate her for increasing confusion. Patient has been trying to climb out of bed. She has refused ABG to R/L hypoxia and hypercarbia. On exam, patient is alert and oriented to person and place. She answers questions appropriately and follows commands. Scattered rhonchi on chest auscultation, physical exam otherwise unremarkable    Patient is a 9year-old female admitted 7/13/2021 for acute hypoxic respiratory failure due to Covid. She has been doing well on HFNC, respiratory rate in the 20s 94 to 97% on 18 LNC    We will continue current plan of care for now.   If concern for hypoxia/hypercarbia persists, will reattempt to get ABG and consider BiPAP      Discussed with unit charge nurse    Patient Vitals for the past 12 hrs:   Temp Pulse Resp BP SpO2   07/16/21 0350 97.4 °F (36.3 °C) 85 24 (!) 152/87 94 %   07/15/21 2300 97.4 °F (36.3 °C) 75 22 (!) 142/59 97 %   07/15/21 2122 97.6 °F (36.4 °C) 96 18 (!) 151/69 93 %   07/15/21 1936 97.4 °F (36.3 °C) 97 20 (!) 154/68 95 %       Recent Results (from the past 12 hour(s))   GLUCOSE, POC    Collection Time: 07/15/21  6:09 PM   Result Value Ref Range    Glucose (POC) 319 (H) 65 - 117 mg/dL    Performed by Camille Canas    GLUCOSE, POC    Collection Time: 07/15/21  9:27 PM   Result Value Ref Range    Glucose (POC) 340 (H) 65 - 117 mg/dL    Performed by get Mclaughlin            ____________________________________________________________________________    GABRIELE Ochoa RN, NP-C  [phone] or via [address] [place] [address]

## 2021-07-16 NOTE — PROGRESS NOTES
Bedside shift change report given to Faisal Meredith RN (oncoming nurse) by [address], RN (offgoing nurse). Report included the following information SBAR, Kardex, ED Summary and Cardiac Rhythm NSR/tach. Problem: Falls - Risk of  Goal: *Absence of Falls  Description: Document [place] Pillow Fall Risk and appropriate interventions in the flowsheet. Outcome: Progressing Towards Goal  Note: Fall Risk Interventions:  Mobility Interventions: (P) Communicate number of staff needed for ambulation/transfer         Medication Interventions: (P) Patient to call before getting OOB, Teach patient to arise slowly, Evaluate medications/consider consulting pharmacy    Elimination Interventions: (P) Patient to call for help with toileting needs, Call light in reach              Problem: Airway Clearance - Ineffective  Goal: Achieve or maintain patent airway  Outcome: Progressing Towards Goal     Problem: Gas Exchange - Impaired  Goal: Absence of hypoxia  Outcome: Progressing Towards Goal  Goal: Promote optimal lung function  Outcome: Progressing Towards Goal     Problem: Breathing Pattern - Ineffective  Goal: Ability to achieve and maintain a regular respiratory rate  Outcome: Progressing Towards Goal     Problem:  Body Temperature -  Risk of, Imbalanced  Goal: Ability to maintain a body temperature within defined limits  Outcome: Progressing Towards Goal     Problem: Isolation Precautions - Risk of Spread of Infection  Goal: Prevent transmission of infectious organism to others  Outcome: Progressing Towards Goal     Problem: Nutrition Deficits  Goal: Optimize nutrtional status  Outcome: Progressing Towards Goal     Problem: Risk for Fluid Volume Deficit  Goal: Maintain normal heart rhythm  Outcome: Progressing Towards Goal  Goal: Maintain absence of muscle cramping  Outcome: Progressing Towards Goal     Problem: Fatigue  Goal: Verbalize increase energy and improved vitality  Outcome: Progressing Towards Goal     Problem: Diabetes Self-Management  Goal: *Disease process and treatment process  Description: Define diabetes and identify own type of diabetes; list 3 options for treating diabetes. Outcome: Progressing Towards Goal  Goal: *Incorporating nutritional management into lifestyle  Description: Describe effect of type, amount and timing of food on blood glucose; list 3 methods for planning meals.   Outcome: Progressing Towards Goal

## 2021-07-16 NOTE — PROGRESS NOTES
Bedside shift change report given to [address] (oncoming nurse) by Aniyah Nunez RN (offgoing nurse).  Report included the following information SBAR, Kardex, ED Summary, Intake/Output and Cardiac Rhythm.

## 2021-07-17 LAB
ALBUMIN SERPL-MCNC: 2.9 G/DL (ref 3.5–5)
ALBUMIN SERPL-MCNC: 2.9 G/DL (ref 3.5–5)
ALBUMIN/GLOB SERPL: 0.7 {RATIO} (ref 1.1–2.2)
ALBUMIN/GLOB SERPL: 0.7 {RATIO} (ref 1.1–2.2)
ALP SERPL-CCNC: 60 U/L (ref 45–117)
ALP SERPL-CCNC: 61 U/L (ref 45–117)
ALT SERPL-CCNC: 16 U/L (ref 12–78)
ALT SERPL-CCNC: 17 U/L (ref 12–78)
ANION GAP SERPL CALC-SCNC: 10 MMOL/L (ref 5–15)
AST SERPL-CCNC: 23 U/L (ref 15–37)
AST SERPL-CCNC: 23 U/L (ref 15–37)
BASOPHILS # BLD: 0 K/UL (ref 0–0.1)
BASOPHILS NFR BLD: 0 % (ref 0–1)
BILIRUB DIRECT SERPL-MCNC: <0.1 MG/DL (ref 0–0.2)
BILIRUB SERPL-MCNC: 0.4 MG/DL (ref 0.2–1)
BILIRUB SERPL-MCNC: 0.4 MG/DL (ref 0.2–1)
BUN SERPL-MCNC: 20 MG/DL (ref 6–20)
BUN/CREAT SERPL: 21 (ref 12–20)
CALCIUM SERPL-MCNC: 8.8 MG/DL (ref 8.5–10.1)
CHLORIDE SERPL-SCNC: 108 MMOL/L (ref 97–108)
CO2 SERPL-SCNC: 23 MMOL/L (ref 21–32)
CREAT SERPL-MCNC: 0.94 MG/DL (ref 0.55–1.02)
DIFFERENTIAL METHOD BLD: ABNORMAL
EOSINOPHIL # BLD: 0 K/UL (ref 0–0.4)
EOSINOPHIL NFR BLD: 0 % (ref 0–7)
ERYTHROCYTE [DISTWIDTH] IN BLOOD BY AUTOMATED COUNT: 14.2 % (ref 11.5–14.5)
GLOBULIN SER CALC-MCNC: 4.3 G/DL (ref 2–4)
GLOBULIN SER CALC-MCNC: 4.3 G/DL (ref 2–4)
GLUCOSE BLD STRIP.AUTO-MCNC: 174 MG/DL (ref 65–117)
GLUCOSE BLD STRIP.AUTO-MCNC: 213 MG/DL (ref 65–117)
GLUCOSE BLD STRIP.AUTO-MCNC: 266 MG/DL (ref 65–117)
GLUCOSE BLD STRIP.AUTO-MCNC: 278 MG/DL (ref 65–117)
GLUCOSE SERPL-MCNC: 185 MG/DL (ref 65–100)
HCT VFR BLD AUTO: 35 % (ref 35–47)
HGB BLD-MCNC: 11.5 G/DL (ref 11.5–16)
IMM GRANULOCYTES # BLD AUTO: 0 K/UL
IMM GRANULOCYTES NFR BLD AUTO: 0 %
LYMPHOCYTES # BLD: 0.7 K/UL (ref 0.8–3.5)
LYMPHOCYTES NFR BLD: 11 % (ref 12–49)
MCH RBC QN AUTO: 28 PG (ref 26–34)
MCHC RBC AUTO-ENTMCNC: 32.9 G/DL (ref 30–36.5)
MCV RBC AUTO: 85.2 FL (ref 80–99)
MONOCYTES # BLD: 0.4 K/UL (ref 0–1)
MONOCYTES NFR BLD: 7 % (ref 5–13)
NEUTS BAND NFR BLD MANUAL: 8 % (ref 0–6)
NEUTS SEG # BLD: 5.3 K/UL (ref 1.8–8)
NEUTS SEG NFR BLD: 74 % (ref 32–75)
NRBC # BLD: 0 K/UL (ref 0–0.01)
NRBC BLD-RTO: 0 PER 100 WBC
PLATELET # BLD AUTO: 373 K/UL (ref 150–400)
PLATELET COMMENTS,PCOM: ABNORMAL
PMV BLD AUTO: 12.1 FL (ref 8.9–12.9)
POTASSIUM SERPL-SCNC: 3.6 MMOL/L (ref 3.5–5.1)
PROT SERPL-MCNC: 7.2 G/DL (ref 6.4–8.2)
PROT SERPL-MCNC: 7.2 G/DL (ref 6.4–8.2)
RBC # BLD AUTO: 4.11 M/UL (ref 3.8–5.2)
RBC MORPH BLD: ABNORMAL
RBC MORPH BLD: ABNORMAL
SERVICE CMNT-IMP: ABNORMAL
SODIUM SERPL-SCNC: 141 MMOL/L (ref 136–145)
WBC # BLD AUTO: 6.4 K/UL (ref 3.6–11)

## 2021-07-17 PROCEDURE — 65660000000 HC RM CCU STEPDOWN

## 2021-07-17 PROCEDURE — 74011250637 HC RX REV CODE- 250/637: Performed by: STUDENT IN AN ORGANIZED HEALTH CARE EDUCATION/TRAINING PROGRAM

## 2021-07-17 PROCEDURE — 74011000258 HC RX REV CODE- 258: Performed by: FAMILY MEDICINE

## 2021-07-17 PROCEDURE — 80053 COMPREHEN METABOLIC PANEL: CPT

## 2021-07-17 PROCEDURE — 82962 GLUCOSE BLOOD TEST: CPT

## 2021-07-17 PROCEDURE — 74011000250 HC RX REV CODE- 250: Performed by: FAMILY MEDICINE

## 2021-07-17 PROCEDURE — 85025 COMPLETE CBC W/AUTO DIFF WBC: CPT

## 2021-07-17 PROCEDURE — 74011250637 HC RX REV CODE- 250/637: Performed by: HOSPITALIST

## 2021-07-17 PROCEDURE — 74011250636 HC RX REV CODE- 250/636: Performed by: FAMILY MEDICINE

## 2021-07-17 PROCEDURE — 36415 COLL VENOUS BLD VENIPUNCTURE: CPT

## 2021-07-17 PROCEDURE — 74011636637 HC RX REV CODE- 636/637: Performed by: HOSPITALIST

## 2021-07-17 PROCEDURE — 74011250637 HC RX REV CODE- 250/637: Performed by: FAMILY MEDICINE

## 2021-07-17 PROCEDURE — 80076 HEPATIC FUNCTION PANEL: CPT

## 2021-07-17 PROCEDURE — 74011250636 HC RX REV CODE- 250/636: Performed by: STUDENT IN AN ORGANIZED HEALTH CARE EDUCATION/TRAINING PROGRAM

## 2021-07-17 PROCEDURE — 77010033711 HC HIGH FLOW OXYGEN

## 2021-07-17 PROCEDURE — 74011250636 HC RX REV CODE- 250/636: Performed by: HOSPITALIST

## 2021-07-17 RX ADMIN — Medication 10 ML: at 13:52

## 2021-07-17 RX ADMIN — ENOXAPARIN SODIUM 40 MG: 40 INJECTION SUBCUTANEOUS at 17:17

## 2021-07-17 RX ADMIN — METOPROLOL TARTRATE 50 MG: 50 TABLET, FILM COATED ORAL at 21:29

## 2021-07-17 RX ADMIN — INSULIN LISPRO 6 UNITS: 100 INJECTION, SOLUTION INTRAVENOUS; SUBCUTANEOUS at 17:41

## 2021-07-17 RX ADMIN — CEFTRIAXONE SODIUM 1 G: 1 INJECTION, POWDER, FOR SOLUTION INTRAMUSCULAR; INTRAVENOUS at 12:37

## 2021-07-17 RX ADMIN — ENOXAPARIN SODIUM 40 MG: 40 INJECTION SUBCUTANEOUS at 05:33

## 2021-07-17 RX ADMIN — METOPROLOL TARTRATE 50 MG: 50 TABLET, FILM COATED ORAL at 09:42

## 2021-07-17 RX ADMIN — DEXAMETHASONE SODIUM PHOSPHATE 6 MG: 10 INJECTION, SOLUTION INTRAMUSCULAR; INTRAVENOUS at 10:06

## 2021-07-17 RX ADMIN — AZITHROMYCIN MONOHYDRATE 500 MG: 500 INJECTION, POWDER, LYOPHILIZED, FOR SOLUTION INTRAVENOUS at 12:36

## 2021-07-17 RX ADMIN — INSULIN LISPRO 5 UNITS: 100 INJECTION, SOLUTION INTRAVENOUS; SUBCUTANEOUS at 12:00

## 2021-07-17 RX ADMIN — Medication 10 ML: at 05:33

## 2021-07-17 RX ADMIN — INSULIN LISPRO 3 UNITS: 100 INJECTION, SOLUTION INTRAVENOUS; SUBCUTANEOUS at 09:38

## 2021-07-17 RX ADMIN — INSULIN LISPRO 6 UNITS: 100 INJECTION, SOLUTION INTRAVENOUS; SUBCUTANEOUS at 11:45

## 2021-07-17 RX ADMIN — Medication 1 CAPSULE: at 09:37

## 2021-07-17 RX ADMIN — INSULIN LISPRO 5 UNITS: 100 INJECTION, SOLUTION INTRAVENOUS; SUBCUTANEOUS at 17:41

## 2021-07-17 RX ADMIN — Medication 10 ML: at 22:00

## 2021-07-17 RX ADMIN — REMDESIVIR 100 MG: 5 INJECTION INTRAVENOUS at 15:08

## 2021-07-17 RX ADMIN — FUROSEMIDE 40 MG: 10 INJECTION, SOLUTION INTRAMUSCULAR; INTRAVENOUS at 09:37

## 2021-07-17 RX ADMIN — INSULIN GLARGINE 20 UNITS: 100 INJECTION, SOLUTION SUBCUTANEOUS at 21:35

## 2021-07-17 RX ADMIN — INSULIN LISPRO 6 UNITS: 100 INJECTION, SOLUTION INTRAVENOUS; SUBCUTANEOUS at 09:38

## 2021-07-17 RX ADMIN — OXYCODONE HYDROCHLORIDE AND ACETAMINOPHEN 500 MG: 500 TABLET ORAL at 17:18

## 2021-07-17 RX ADMIN — OXYCODONE HYDROCHLORIDE AND ACETAMINOPHEN 500 MG: 500 TABLET ORAL at 09:42

## 2021-07-17 RX ADMIN — ASPIRIN 81 MG: 81 TABLET, CHEWABLE ORAL at 09:41

## 2021-07-17 RX ADMIN — AMLODIPINE BESYLATE 10 MG: 5 TABLET ORAL at 09:41

## 2021-07-17 NOTE — PROGRESS NOTES
[address] [facility] Adult  Hospitalist Group                                                                                          Hospitalist Progress Note  Meredith Middleton MD  Answering service: [phone] from in house phone        Date of Service:  2021  NAME:  Wesley Arriaga  :  1950  MRN:  [id number]    This documentation was facilitated by a Voice Recognition software and may contain inadvertent typographical errors. Admission Summary:   Patient presented to the ED on  with shortness of breath and fever and she was found to have Covid pneumonia. She is not vaccinated    Interval history / Subjective:        She had uneventful night and day so far. Labs, imaging orders reviewed  Patient seen and examined at the bedside. She is lying comfortably. She did not have a the oxygen on nor the pulse ox. Placed the pulse ox, her SPO2 was 84 room air, put her back on oxygen via the high flow nasal cannula and SPO2 popped up to> 90% right away. I counseled patient on the need for keeping the oxygen on. Explained to her that with too low oxygen she may sustain brain damage and other complications. Discussed with RN to closely watch her and reinforce teaching      Assessment & Plan:   Acute hypoxic respiratory failure  D/t SARS-CoV-2 infection   CTA no PE but findings consistent with atypical viral pneumonia  C/w Remdesivir, dexamethasone  Acterma x 1 dose. No evidence of pneumonia/infetion,d/cd  emperic Abx ,  Follow inflammatory markers. DVT prophylaxis per Covid protocol. D-dimer 0.65 on 7/15  On oxygen via high flow nasal cannula wean off as able to keep SPO2> 90%  Pulmonary following. Continue IV diuretics. Uncontrolled DM with hyperglycemia precipitated by steroid and acute illness  A1C10.3  She mentions not taking any meds currently  Increase Lantus to 20 units nightly. Added Premeal Humalog 6 units AC 3 times daily  Humalog sliding scale coverage. Elevated creatinine: Normalized  --Unknown baseline. Uncontrolled HTN:BP not on target,but fairly stable mainly in the 834-541E systolically  C/w Metoprolol, amlodipine  Hydralazine prn    Access: PICC line placed on 7/15     DVT ppx: Lovenox per Covid protocol  CODE STATUS: She wishes to be full code  Isolation: Droplet plus  Family update  --Called and left generic VM to daughter Sharlene Benites on the number listed in the chart #[phone]       Morbid obesity  Estimated discharge date: Patient still requiring high flow oxygen, she is expected to be here >72 hours      Hospital Problems  Never Reviewed        Codes Class Noted POA    COVID-19 ICD-10-CM: U07.1  ICD-9-CM: 079.89  7/13/2021 Unknown                Review of Systems:   A comprehensive review of systems was negative except for that written in the HPI. Vital Signs:    Last 24hrs VS reviewed since prior progress note. Most recent are:  Visit Vitals  BP (!) 145/77 (BP 1 Location: Left upper arm, BP Patient Position: At rest)   Pulse 99   Temp 97.4 °F (36.3 °C)   Resp 22   Ht 5' 5\" (1.651 m)   Wt 92.1 kg (203 lb)   SpO2 94%   BMI 33.78 kg/m²         Intake/Output Summary (Last 24 hours) at 7/17/2021 1729  Last data filed at 7/17/2021 1200  Gross per 24 hour   Intake 820 ml   Output 550 ml   Net 270 ml        Physical Examination:     I had a face to face encounter with this patient and independently examined them on7/17/2021 as outlined below:        Constitutional:  Alert oriented, not in distress    HEENT:  Atraumatic. Oral mucosa moist,. Non icteric sclera. No pallor. Resp:  On oxygen via high flow nasal cannula: Symmetrical air entry. Chest Wall: No deformity   CV:  Regular rhythm, normal rate, no murmurs, gallops, rubs    GI:  Soft, non distended, non tender.  normoactive bowel sounds, no hepatosplenomegaly    :  No CVA or suprapubic tenderness    Musculoskeletal:  No edema, warm, 2+ pulses throughout    Neurologic:  Mental status:AAOx3,   Cranial nerves II-XII : WNL  Motor exam:Moves all extremities symmetrically              Data Review:    Review and/or order of clinical lab test  Review and/or order of tests in the radiology section of CPT  Review and/or order of tests in the medicine section of CPT      Labs:     Recent Labs     07/17/21 0212 07/16/21 0352   WBC 6.4 6.3   HGB 11.5 11.1*   HCT 35.0 35.5    361     Recent Labs     07/17/21 0212 07/16/21 0354    141   K 3.6 4.3    110*   CO2 23 22   BUN 20 20   CREA 0.94 0.97   * 269*   CA 8.8 9.2     Recent Labs     07/17/21 0213 07/17/21 0212 07/16/21 0354   ALT 16 17 18   AP 60 61 63   TBILI 0.4 0.4 0.3   TP 7.2 7.2 7.6   ALB 2.9* 2.9* 3.0*   GLOB 4.3* 4.3* 4.6*     No results for input(s): INR, PTP, APTT, INREXT, INREXT in the last 72 hours. No results for input(s): FE, TIBC, PSAT, FERR in the last 72 hours. No results found for: FOL, RBCF   No results for input(s): PH, PCO2, PO2 in the last 72 hours. No results for input(s): CPK, CKNDX, TROIQ in the last 72 hours.     No lab exists for component: CPKMB  No results found for: CHOL, CHOLX, CHLST, CHOLV, HDL, HDLP, LDL, LDLC, DLDLP, TGLX, TRIGL, TRIGP, CHHD, CHHDX  Lab Results   Component Value Date/Time    Glucose (POC) 266 (H) 07/17/2021 04:59 PM    Glucose (POC) 278 (H) 07/17/2021 12:24 PM    Glucose (POC) 213 (H) 07/17/2021 07:48 AM    Glucose (POC) 268 (H) 07/16/2021 09:48 PM    Glucose (POC) 238 (H) 07/16/2021 05:05 PM     No results found for: COLOR, APPRN, SPGRU, REFSG, KEE, PROTU, GLUCU, KETU, BILU, UROU, GAUTAM, LEUKU, GLUKE, EPSU, BACTU, WBCU, RBCU, CASTS, UCRY      Medications Reviewed:     Current Facility-Administered Medications   Medication Dose Route Frequency    furosemide (LASIX) injection 40 mg  40 mg IntraVENous DAILY    L.acidophilus-paracasei-S.thermophil-bifidobacter (RISAQUAD) 8 billion cell capsule  1 Capsule Oral DAILY    insulin lispro (HUMALOG) injection 6 Units  6 Units SubCUTAneous TIDAC    insulin glargine (LANTUS) injection 20 Units  20 Units SubCUTAneous QHS    enoxaparin (LOVENOX) injection 40 mg  40 mg SubCUTAneous Q12H    insulin lispro (HUMALOG)   SubCUTAneous AC&HS    glucagon (GLUCAGEN) injection 1 mg  1 mg IntraMUSCular PRN    dextrose (D50W) injection syrg 12.5 g  25 mL IntraVENous PRN    glucose chewable tablet 12 g  3 Tablet Oral PRN    amLODIPine (NORVASC) tablet 10 mg  10 mg Oral DAILY    metoprolol tartrate (LOPRESSOR) tablet 50 mg  50 mg Oral Q12H    dexamethasone (PF) (DECADRON) 10 mg/mL injection 6 mg  6 mg IntraVENous Q24H    sodium chloride (NS) flush 5-40 mL  5-40 mL IntraVENous Q8H    sodium chloride (NS) flush 5-40 mL  5-40 mL IntraVENous PRN    cefTRIAXone (ROCEPHIN) 1 g in 0.9% sodium chloride (MBP/ADV) 50 mL MBP  1 g IntraVENous Q24H    azithromycin (ZITHROMAX) 500 mg in 0.9% sodium chloride 250 mL (VIAL-MATE)  500 mg IntraVENous Q24H    acetaminophen (TYLENOL) tablet 650 mg  650 mg Oral Q4H PRN    zinc sulfate (ZINCATE) 50 mg zinc (220 mg) capsule 1 Capsule  1 Capsule Oral DAILY    ascorbic acid (vitamin C) (VITAMIN C) tablet 500 mg  500 mg Oral BID    aspirin chewable tablet 81 mg  81 mg Oral DAILY    hydrALAZINE (APRESOLINE) 20 mg/mL injection 10 mg  10 mg IntraVENous Q4H PRN     ______________________________________________________________________  EXPECTED LENGTH OF STAY: 5d 9h  ACTUAL LENGTH OF STAY:          4                 Nicky Jorge MD

## 2021-07-17 NOTE — PROGRESS NOTES
Problem: Falls - Risk of  Goal: *Absence of Falls  Description: Document Ariela Bautista Fall Risk and appropriate interventions in the flowsheet. Outcome: Progressing Towards Goal  Note: Fall Risk Interventions:  Mobility Interventions: Bed/chair exit alarm, Patient to call before getting OOB    Mentation Interventions: Adequate sleep, hydration, pain control, Bed/chair exit alarm, Evaluate medications/consider consulting pharmacy, More frequent rounding, Room close to nurse's station    Medication Interventions: Bed/chair exit alarm, Evaluate medications/consider consulting pharmacy, Patient to call before getting OOB, Teach patient to arise slowly    Elimination Interventions: Bed/chair exit alarm, Call light in reach, Patient to call for help with toileting needs              Problem: Patient Education: Go to Patient Education Activity  Goal: Patient/Family Education  Outcome: Progressing Towards Goal     Problem: Airway Clearance - Ineffective  Goal: Achieve or maintain patent airway  Outcome: Progressing Towards Goal     Problem: Gas Exchange - Impaired  Goal: Absence of hypoxia  Outcome: Progressing Towards Goal  Goal: Promote optimal lung function  Outcome: Progressing Towards Goal     Problem: Breathing Pattern - Ineffective  Goal: Ability to achieve and maintain a regular respiratory rate  Outcome: Progressing Towards Goal     Problem:  Body Temperature -  Risk of, Imbalanced  Goal: Ability to maintain a body temperature within defined limits  Outcome: Progressing Towards Goal  Goal: Will regain or maintain usual level of consciousness  Outcome: Progressing Towards Goal  Goal: Complications related to the disease process, condition or treatment will be avoided or minimized  Outcome: Progressing Towards Goal     Problem: Isolation Precautions - Risk of Spread of Infection  Goal: Prevent transmission of infectious organism to others  Outcome: Progressing Towards Goal     Problem: Nutrition Deficits  Goal: Optimize nutrtional status  Outcome: Progressing Towards Goal     Problem: Risk for Fluid Volume Deficit  Goal: Maintain normal heart rhythm  Outcome: Progressing Towards Goal  Goal: Maintain absence of muscle cramping  Outcome: Progressing Towards Goal  Goal: Maintain normal serum potassium, sodium, calcium, phosphorus, and pH  Outcome: Progressing Towards Goal     Problem: Loneliness or Risk for Loneliness  Goal: Demonstrate positive use of time alone when socialization is not possible  Outcome: Progressing Towards Goal     Problem: Fatigue  Goal: Verbalize increase energy and improved vitality  Outcome: Progressing Towards Goal     Problem: Patient Education: Go to Patient Education Activity  Goal: Patient/Family Education  Outcome: Progressing Towards Goal     Problem: Diabetes Self-Management  Goal: *Disease process and treatment process  Description: Define diabetes and identify own type of diabetes; list 3 options for treating diabetes. Outcome: Progressing Towards Goal  Goal: *Incorporating nutritional management into lifestyle  Description: Describe effect of type, amount and timing of food on blood glucose; list 3 methods for planning meals. Outcome: Progressing Towards Goal  Goal: *Incorporating physical activity into lifestyle  Description: State effect of exercise on blood glucose levels. Outcome: Progressing Towards Goal  Goal: *Developing strategies to promote health/change behavior  Description: Define the ABC's of diabetes; identify appropriate screenings, schedule and personal plan for screenings. Outcome: Progressing Towards Goal  Goal: *Using medications safely  Description: State effect of diabetes medications on diabetes; name diabetes medication taking, action and side effects. Outcome: Progressing Towards Goal  Goal: *Monitoring blood glucose, interpreting and using results  Description: Identify recommended blood glucose targets  and personal targets.   Outcome: Progressing Towards Goal  Goal: *Prevention, detection, treatment of acute complications  Description: List symptoms of hyper- and hypoglycemia; describe how to treat low blood sugar and actions for lowering  high blood glucose level. Outcome: Progressing Towards Goal  Goal: *Prevention, detection and treatment of chronic complications  Description: Define the natural course of diabetes and describe the relationship of blood glucose levels to long term complications of diabetes. Outcome: Progressing Towards Goal  Goal: *Developing strategies to address psychosocial issues  Description: Describe feelings about living with diabetes; identify support needed and support network  Outcome: Progressing Towards Goal  Goal: *Insulin pump training  Outcome: Progressing Towards Goal  Goal: *Sick day guidelines  Outcome: Progressing Towards Goal  Goal: *Patient Specific Goal (EDIT GOAL, INSERT TEXT)  Outcome: Progressing Towards Goal     Problem: Patient Education: Go to Patient Education Activity  Goal: Patient/Family Education  Outcome: Progressing Towards Goal     Problem: Pressure Injury - Risk of  Goal: *Prevention of pressure injury  Description: Document Alexys Scale and appropriate interventions in the flowsheet.   Outcome: Progressing Towards Goal  Note: Pressure Injury Interventions:       Moisture Interventions: Absorbent underpads, Check for incontinence Q2 hours and as needed, Maintain skin hydration (lotion/cream), Minimize layers    Activity Interventions: Assess need for specialty bed, Increase time out of bed    Mobility Interventions: HOB 30 degrees or less, Pressure redistribution bed/mattress (bed type), PT/OT evaluation    Nutrition Interventions: Document food/fluid/supplement intake    Friction and Shear Interventions: Apply protective barrier, creams and emollients, HOB 30 degrees or less, Lift sheet, Minimize layers                Problem: Patient Education: Go to Patient Education Activity  Goal: Patient/Family Education  Outcome: Progressing Towards Goal

## 2021-07-17 NOTE — PROGRESS NOTES
Bedside shift change report given to  oncoming nurse by Lester lopez. Report included the following information SBAR, Kardex, ED Summary, OR Summary, Procedure Summary, Intake/Output, MAR, Accordion, Recent Results, Med Rec Status, Cardiac Rhythm , Alarm Parameters , Pre Procedure Checklist, Procedure Verification, Quality Measures and Dual Neuro Assessment.

## 2021-07-18 LAB
ALBUMIN SERPL-MCNC: 3 G/DL (ref 3.5–5)
ALBUMIN/GLOB SERPL: 0.7 {RATIO} (ref 1.1–2.2)
ALP SERPL-CCNC: 64 U/L (ref 45–117)
ALT SERPL-CCNC: 21 U/L (ref 12–78)
ANION GAP SERPL CALC-SCNC: 8 MMOL/L (ref 5–15)
AST SERPL-CCNC: 31 U/L (ref 15–37)
BACTERIA SPEC CULT: NORMAL
BACTERIA SPEC CULT: NORMAL
BASOPHILS # BLD: 0 K/UL (ref 0–0.1)
BASOPHILS NFR BLD: 0 % (ref 0–1)
BILIRUB SERPL-MCNC: 0.4 MG/DL (ref 0.2–1)
BUN SERPL-MCNC: 24 MG/DL (ref 6–20)
BUN/CREAT SERPL: 22 (ref 12–20)
CALCIUM SERPL-MCNC: 9 MG/DL (ref 8.5–10.1)
CHLORIDE SERPL-SCNC: 108 MMOL/L (ref 97–108)
CO2 SERPL-SCNC: 26 MMOL/L (ref 21–32)
CREAT SERPL-MCNC: 1.09 MG/DL (ref 0.55–1.02)
CRP SERPL-MCNC: 1.56 MG/DL (ref 0–0.6)
D DIMER PPP FEU-MCNC: 2.02 MG/L FEU (ref 0–0.65)
DIFFERENTIAL METHOD BLD: ABNORMAL
EOSINOPHIL # BLD: 0 K/UL (ref 0–0.4)
EOSINOPHIL NFR BLD: 0 % (ref 0–7)
ERYTHROCYTE [DISTWIDTH] IN BLOOD BY AUTOMATED COUNT: 14 % (ref 11.5–14.5)
GLOBULIN SER CALC-MCNC: 4.3 G/DL (ref 2–4)
GLUCOSE BLD STRIP.AUTO-MCNC: 110 MG/DL (ref 65–117)
GLUCOSE BLD STRIP.AUTO-MCNC: 160 MG/DL (ref 65–117)
GLUCOSE BLD STRIP.AUTO-MCNC: 228 MG/DL (ref 65–117)
GLUCOSE BLD STRIP.AUTO-MCNC: 253 MG/DL (ref 65–117)
GLUCOSE SERPL-MCNC: 149 MG/DL (ref 65–100)
HCT VFR BLD AUTO: 37.1 % (ref 35–47)
HGB BLD-MCNC: 11.9 G/DL (ref 11.5–16)
IMM GRANULOCYTES # BLD AUTO: 0 K/UL
IMM GRANULOCYTES NFR BLD AUTO: 0 %
LYMPHOCYTES # BLD: 0.7 K/UL (ref 0.8–3.5)
LYMPHOCYTES NFR BLD: 9 % (ref 12–49)
MCH RBC QN AUTO: 27.4 PG (ref 26–34)
MCHC RBC AUTO-ENTMCNC: 32.1 G/DL (ref 30–36.5)
MCV RBC AUTO: 85.3 FL (ref 80–99)
MONOCYTES # BLD: 0.6 K/UL (ref 0–1)
MONOCYTES NFR BLD: 8 % (ref 5–13)
NEUTS SEG # BLD: 6.5 K/UL (ref 1.8–8)
NEUTS SEG NFR BLD: 83 % (ref 32–75)
NRBC # BLD: 0.02 K/UL (ref 0–0.01)
NRBC BLD-RTO: 0.3 PER 100 WBC
PLATELET # BLD AUTO: 411 K/UL (ref 150–400)
PLATELET COMMENTS,PCOM: ABNORMAL
PMV BLD AUTO: 12.2 FL (ref 8.9–12.9)
POTASSIUM SERPL-SCNC: 3.6 MMOL/L (ref 3.5–5.1)
PROT SERPL-MCNC: 7.3 G/DL (ref 6.4–8.2)
RBC # BLD AUTO: 4.35 M/UL (ref 3.8–5.2)
RBC MORPH BLD: ABNORMAL
SERVICE CMNT-IMP: ABNORMAL
SERVICE CMNT-IMP: NORMAL
SODIUM SERPL-SCNC: 142 MMOL/L (ref 136–145)
WBC # BLD AUTO: 7.8 K/UL (ref 3.6–11)
WBC MORPH BLD: ABNORMAL

## 2021-07-18 PROCEDURE — 74011250636 HC RX REV CODE- 250/636: Performed by: STUDENT IN AN ORGANIZED HEALTH CARE EDUCATION/TRAINING PROGRAM

## 2021-07-18 PROCEDURE — 80053 COMPREHEN METABOLIC PANEL: CPT

## 2021-07-18 PROCEDURE — 74011250636 HC RX REV CODE- 250/636: Performed by: HOSPITALIST

## 2021-07-18 PROCEDURE — 85025 COMPLETE CBC W/AUTO DIFF WBC: CPT

## 2021-07-18 PROCEDURE — 85379 FIBRIN DEGRADATION QUANT: CPT

## 2021-07-18 PROCEDURE — 86140 C-REACTIVE PROTEIN: CPT

## 2021-07-18 PROCEDURE — 65660000000 HC RM CCU STEPDOWN

## 2021-07-18 PROCEDURE — 74011250637 HC RX REV CODE- 250/637: Performed by: FAMILY MEDICINE

## 2021-07-18 PROCEDURE — 36415 COLL VENOUS BLD VENIPUNCTURE: CPT

## 2021-07-18 PROCEDURE — 74011250637 HC RX REV CODE- 250/637: Performed by: STUDENT IN AN ORGANIZED HEALTH CARE EDUCATION/TRAINING PROGRAM

## 2021-07-18 PROCEDURE — 74011636637 HC RX REV CODE- 636/637: Performed by: HOSPITALIST

## 2021-07-18 PROCEDURE — 36592 COLLECT BLOOD FROM PICC: CPT

## 2021-07-18 PROCEDURE — 74011250637 HC RX REV CODE- 250/637: Performed by: HOSPITALIST

## 2021-07-18 PROCEDURE — 82962 GLUCOSE BLOOD TEST: CPT

## 2021-07-18 RX ORDER — ENOXAPARIN SODIUM 100 MG/ML
30 INJECTION SUBCUTANEOUS EVERY 12 HOURS
Status: DISCONTINUED | OUTPATIENT
Start: 2021-07-18 | End: 2021-07-19

## 2021-07-18 RX ADMIN — INSULIN LISPRO 3 UNITS: 100 INJECTION, SOLUTION INTRAVENOUS; SUBCUTANEOUS at 13:57

## 2021-07-18 RX ADMIN — DEXAMETHASONE SODIUM PHOSPHATE 6 MG: 10 INJECTION, SOLUTION INTRAMUSCULAR; INTRAVENOUS at 10:49

## 2021-07-18 RX ADMIN — Medication 1 CAPSULE: at 10:49

## 2021-07-18 RX ADMIN — FUROSEMIDE 40 MG: 10 INJECTION, SOLUTION INTRAMUSCULAR; INTRAVENOUS at 10:49

## 2021-07-18 RX ADMIN — INSULIN GLARGINE 20 UNITS: 100 INJECTION, SOLUTION SUBCUTANEOUS at 22:18

## 2021-07-18 RX ADMIN — Medication 10 ML: at 13:58

## 2021-07-18 RX ADMIN — METOPROLOL TARTRATE 50 MG: 50 TABLET, FILM COATED ORAL at 21:52

## 2021-07-18 RX ADMIN — INSULIN LISPRO 2 UNITS: 100 INJECTION, SOLUTION INTRAVENOUS; SUBCUTANEOUS at 10:50

## 2021-07-18 RX ADMIN — ACETAMINOPHEN 650 MG: 325 TABLET ORAL at 20:27

## 2021-07-18 RX ADMIN — INSULIN LISPRO 6 UNITS: 100 INJECTION, SOLUTION INTRAVENOUS; SUBCUTANEOUS at 10:49

## 2021-07-18 RX ADMIN — INSULIN LISPRO 6 UNITS: 100 INJECTION, SOLUTION INTRAVENOUS; SUBCUTANEOUS at 13:57

## 2021-07-18 RX ADMIN — ASPIRIN 81 MG: 81 TABLET, CHEWABLE ORAL at 10:49

## 2021-07-18 RX ADMIN — OXYCODONE HYDROCHLORIDE AND ACETAMINOPHEN 500 MG: 500 TABLET ORAL at 18:58

## 2021-07-18 RX ADMIN — Medication 10 ML: at 06:00

## 2021-07-18 RX ADMIN — METOPROLOL TARTRATE 50 MG: 50 TABLET, FILM COATED ORAL at 10:49

## 2021-07-18 RX ADMIN — ENOXAPARIN SODIUM 40 MG: 40 INJECTION SUBCUTANEOUS at 06:30

## 2021-07-18 RX ADMIN — AMLODIPINE BESYLATE 10 MG: 5 TABLET ORAL at 10:49

## 2021-07-18 RX ADMIN — ENOXAPARIN SODIUM 30 MG: 30 INJECTION SUBCUTANEOUS at 18:57

## 2021-07-18 RX ADMIN — INSULIN LISPRO 3 UNITS: 100 INJECTION, SOLUTION INTRAVENOUS; SUBCUTANEOUS at 22:18

## 2021-07-18 RX ADMIN — INSULIN LISPRO 6 UNITS: 100 INJECTION, SOLUTION INTRAVENOUS; SUBCUTANEOUS at 16:30

## 2021-07-18 RX ADMIN — Medication 10 ML: at 21:53

## 2021-07-18 RX ADMIN — OXYCODONE HYDROCHLORIDE AND ACETAMINOPHEN 500 MG: 500 TABLET ORAL at 10:49

## 2021-07-18 NOTE — PROGRESS NOTES
[address] [facility] Adult  Hospitalist Group                                                                                          Hospitalist Progress Note  Antony Bentley MD  Answering service: [phone] from in house phone        Date of Service:  2021  NAME:  Génesis Simpson  :  1950  MRN:  [id number]    This documentation was facilitated by a Voice Recognition software and may contain inadvertent typographical errors. Admission Summary:   Patient presented to the ED on  with shortness of breath and fever and she was found to have Covid pneumonia. She is not vaccinated    Interval history / Subjective:        RRT called for hypoxia due to patient removing the HG nasal canula. I responded,RRT team in the room. Patient sitting in the bed with the HFNC on now and her spo2 is coming up. She has been doing this on and off and Ashanti found her with her oxygen in the 80s having taken off her oxygen canula ,myself and nursing staff has been repeatedly emphasizing the importance of keeping the oxygen and educating her on the dangers of low oxygen including trever damage. She is agitated however alert. Sitting in bed. When I explained to her if she continue to do this she will have to end up worsening of her respiratory status and end on the ventilator. At this point she said she does not want intubation and being on the ventilator ,repeatedly saying\" I make my won decision,\" She said she is okay with chest compression. When I tried to explain to her the connection between CPR and intubation,she said she did not want to talk about this anymore. She is alert and oriented to place and person,she could tell she came Tuesday (which is correct),she thought today is Saturday!,knew the Ramon Rosas name of the president,she could remember and  tell us correctly her daughter's and son's phone numbers. She was said to have some confusion night time and was seen by night nP on  for episode of confusion  When it was noted\"on exam, patient is alert and oriented to person and place. She answers questions appropriately and follows commands. \"    I was trying to reach her daughter Airam Wing since yesterday. I was able to get hold of her now,talked to her on the house phone in the patient's room and explained to her the situation. She tried to talk to her mother but pt kept saying\" I am my own decision maker,\" complained about the needs and sticks we do to her that she is tired of it. I let her daughter know her mother has chosen not be intubated and placed on ventilator if that was needed,but she is okay with CPR. I think she needs to be at higher level of care for lose monitoring and I have consulted and talked with intensivist,discussed with ICU NP Tram who came down to see patient. Assessment & Plan:   Acute hypoxic respiratory failure  D/t SARS-CoV-2 infection   CTA no PE but findings consistent with atypical viral pneumonia  C/w Remdesivir, dexamethasone  Acterma x 1 dose. No evidence of pneumonia/infetion,d/cd  emperic Abx ,7/17  Follow inflammatory markers. DVT prophylaxis per Covid protocol. D-dimer 0.65 on 7/15  On oxygen via high flow nasal cannula wean off as able to keep SPO2> 90%,patient poorly complaint with keeping the HFNC. Pulmonary following. Continue IV diuretics. Uncontrolled DM with hyperglycemia precipitated by steroid and acute illness  A1C10.3  She mentions not taking any meds currently  Increase Lantus to 20 units nightly. Added Premeal Humalog 6 units AC 3 times daily  Humalog sliding scale coverage. Elevated creatinine: Normalized  --Unknown baseline.       Uncontrolled HTN:BP not on target,but fairly stable mainly in the 288-152N systolically  C/w Metoprolol, amlodipine  Hydralazine prn    Access: PICC line placed on 7/15     DVT ppx: Lovenox per Covid protocol  CODE STATUS: She wishes to be full code  Isolation: Droplet plus  Family update  --discussed with her daughter Valerio Baca on the phone  #[phone] on 7/18 in the patient's room       Morbid obesity  Estimated discharge date: Patient still requiring high flow oxygen. Hospital Problems  Never Reviewed        Codes Class Noted POA    COVID-19 ICD-10-CM: U07.1  ICD-9-CM: 079.89  7/13/2021 Unknown                Review of Systems:   A comprehensive review of systems was negative except for that written in the HPI. Vital Signs:    Last 24hrs VS reviewed since prior progress note. Most recent are:  Visit Vitals  BP (!) 148/86 (BP 1 Location: Left arm)   Pulse 79   Temp 98.4 °F (36.9 °C)   Resp 20   Ht 5' 5\" (1.651 m)   Wt 72.2 kg (159 lb 1.6 oz)   SpO2 96%   BMI 26.48 kg/m²         Intake/Output Summary (Last 24 hours) at 7/18/2021 0816  Last data filed at 7/17/2021 1600  Gross per 24 hour   Intake 750 ml   Output 300 ml   Net 450 ml        Physical Examination:     I had a face to face encounter with this patient and independently examined them on7/18/2021 as outlined below:        Constitutional:  Alert and oriented,agitatated    HEENT:  Atraumatic. Oral mucosa moist,. Non icteric sclera. No pallor. Resp:  On oxygen via high flow nasal cannula: Symmetrical air entry. Chest Wall: No deformity   CV:  Regular rhythm, normal rate, no murmurs, gallops, rubs    GI:  Soft, non distended, non tender. normoactive bowel sounds, no hepatosplenomegaly    :  No CVA or suprapubic tenderness    Musculoskeletal:  No edema, warm, 2+ pulses throughout    Neurologic:  Mental status:AAOx3,agitated.    Cranial nerves II-XII : WNL  Motor exam:Moves all extremities symmetrically              Data Review:    Review and/or order of clinical lab test  Review and/or order of tests in the radiology section of CPT  Review and/or order of tests in the medicine section of CPT      Labs:     Recent Labs     07/18/21 0224 07/17/21 0212   WBC 7.8 6.4   HGB 11.9 11.5   HCT 37.1 35.0   * 373     Recent Labs     07/18/21 0224 07/17/21  0212 07/16/21  0354    141 141   K 3.6 3.6 4.3    108 110*   CO2 26 23 22   BUN 24* 20 20   CREA 1.09* 0.94 0.97   * 185* 269*   CA 9.0 8.8 9.2     Recent Labs     07/18/21  0224 07/17/21  0213 07/17/21  0212   ALT 21 16 17   AP 64 60 61   TBILI 0.4 0.4 0.4   TP 7.3 7.2 7.2   ALB 3.0* 2.9* 2.9*   GLOB 4.3* 4.3* 4.3*     No results for input(s): INR, PTP, APTT, INREXT, INREXT in the last 72 hours. No results for input(s): FE, TIBC, PSAT, FERR in the last 72 hours. No results found for: FOL, RBCF   No results for input(s): PH, PCO2, PO2 in the last 72 hours. No results for input(s): CPK, CKNDX, TROIQ in the last 72 hours.     No lab exists for component: CPKMB  No results found for: CHOL, CHOLX, CHLST, CHOLV, HDL, HDLP, LDL, LDLC, DLDLP, TGLX, TRIGL, TRIGP, CHHD, CHHDX  Lab Results   Component Value Date/Time    Glucose (POC) 160 (H) 07/18/2021 07:24 AM    Glucose (POC) 174 (H) 07/17/2021 09:26 PM    Glucose (POC) 266 (H) 07/17/2021 04:59 PM    Glucose (POC) 278 (H) 07/17/2021 12:24 PM    Glucose (POC) 213 (H) 07/17/2021 07:48 AM     No results found for: COLOR, APPRN, SPGRU, REFSG, KEE, PROTU, GLUCU, KETU, BILU, UROU, GAUTAM, LEUKU, GLUKE, EPSU, BACTU, WBCU, RBCU, CASTS, UCRY      Medications Reviewed:     Current Facility-Administered Medications   Medication Dose Route Frequency    furosemide (LASIX) injection 40 mg  40 mg IntraVENous DAILY    L.acidophilus-paracasei-S.thermophil-bifidobacter (RISAQUAD) 8 billion cell capsule  1 Capsule Oral DAILY    insulin lispro (HUMALOG) injection 6 Units  6 Units SubCUTAneous TIDAC    insulin glargine (LANTUS) injection 20 Units  20 Units SubCUTAneous QHS    enoxaparin (LOVENOX) injection 40 mg  40 mg SubCUTAneous Q12H    insulin lispro (HUMALOG)   SubCUTAneous AC&HS    glucagon (GLUCAGEN) injection 1 mg  1 mg IntraMUSCular PRN    dextrose (D50W) injection syrg 12.5 g  25 mL IntraVENous PRN    glucose chewable tablet 12 g  3 Tablet Oral PRN    amLODIPine (NORVASC) tablet 10 mg  10 mg Oral DAILY    metoprolol tartrate (LOPRESSOR) tablet 50 mg  50 mg Oral Q12H    dexamethasone (PF) (DECADRON) 10 mg/mL injection 6 mg  6 mg IntraVENous Q24H    sodium chloride (NS) flush 5-40 mL  5-40 mL IntraVENous Q8H    sodium chloride (NS) flush 5-40 mL  5-40 mL IntraVENous PRN    acetaminophen (TYLENOL) tablet 650 mg  650 mg Oral Q4H PRN    zinc sulfate (ZINCATE) 50 mg zinc (220 mg) capsule 1 Capsule  1 Capsule Oral DAILY    ascorbic acid (vitamin C) (VITAMIN C) tablet 500 mg  500 mg Oral BID    aspirin chewable tablet 81 mg  81 mg Oral DAILY    hydrALAZINE (APRESOLINE) 20 mg/mL injection 10 mg  10 mg IntraVENous Q4H PRN     ______________________________________________________________________  EXPECTED LENGTH OF STAY: 5d 9h  ACTUAL LENGTH OF STAY:          5                 Bebe Longoria MD

## 2021-07-18 NOTE — ACP (ADVANCE CARE PLANNING)
Advance Care Planning     Advance Care Planning (ACP) Physician/NP/PA Conversation      Date of Conversation: 7/13/2021  Conducted with: Patient with Decision Making Capacity and her daughter Anmol Lin on the phone. Healthcare Decision Maker:     Click here to complete Healthcare Decision Makers including selection of the Healthcare Decision Maker Relationship (ie \"Primary\")  Today we documented Decision Maker(s) consistent with Legal Next of Kin hierarchy. Surrogate decision makers :daughter Anmol Lin and a son. Care Preferences: Today's conversation is mainly around [address] and goals of care. Patient has been intermittently noncompliant with his treatment, removing oxygen where she could be find with very low oxygen as low as 70s80s. Except mild confusion at night, patient is alert and oriented x3, remembers details including her children's phone numbers by heart. We had repeated an extensive discussion on the need for keeping her oxygen to a safe level, at least above 90. Explaining to her that very low oxygen would lead to organ damage including brain injury. If she continues not to comply with the treatment including keeping the oxygen on that she may end up worsening respiratory status end up on a ventilator. Patient made it clear to this writer in the presence of nurses and later on to the ICU NP that she would not want intubation and mechanical ventilation. She is however okay with CPR. I had called her daughter in the patient's room made her aware of all of these including the patient's wishes to no intubation. Hospitalization: We did not discuss about future hospitalization  Ventilation: \"If you were unable to breathe on your own and your chance of recovery was unlikely, what would be your preference about the use of a ventilator (breathing machine) if it was available to you? \"   The patient would NOT desire the use of a ventilator. Resuscitation:  \"In the event your heart stopped as a result of an underlying serious health condition, would you want attempts to be made to restart your heart, or would you prefer a natural death? \"   Yes, attempt to resuscitate. Additional topics discussed: resuscitation preferences   --No intubation  --She is okay with CPR    Conversation Outcomes / Follow-Up Plan:   ACP in process - information provided, considering goals and options  Reviewed DNR/DNI and patient   Partial code: NO intubation. YES CPR/shock.     Length of Voluntary ACP Conversation in minutes:  25 minutes    Leti Koch MD

## 2021-07-18 NOTE — ROUTINE PROCESS
Bedside, Verbal and Written shift change report given to Zaida RN  (oncoming nurse) by Dalia Rogers RN  (offgoing nurse). Report included the following information SBAR, Kardex, ED Summary, Procedure Summary, Intake/Output, MAR, Accordion, Recent Results, Med Rec Status, Cardiac Rhythm NSR, Alarm Parameters , Pre Procedure Checklist, Procedure Verification, Quality Measures and Dual Neuro Assessment.

## 2021-07-18 NOTE — PROGRESS NOTES
Called her daughter Tisha Sanford. Patient combative. She states she does not want intubation but if she states she does want compressions if her heart stops and became agitated and stated she was going home.  Dr. Moreno How at bedside completing assessment    She reports [phone] - her son lives in [place] [address] - attempted to call him and it went to     She stated she will not sign anything - became agitated regarding chest compression

## 2021-07-18 NOTE — CONSULTS
[facility]    ICU Consult Note    Name: Nathanael Montoya   : 1950   MRN: [id number]   Date: 2021      Brief HPI: Ms. Felipe Javed is a 80 yo female who presented to the ED on  with a progressive cough and feeling unwell. She was diagnosed withCOVID-19 and admitted to the [facility] for further management. She has been on high flow O2 and over the last 24 hours has been removing her oxygen at times and desaturating into the 70s. The ICU team was asked to evaluate her for closer monitoring and observation. Reason for ICU Consult: Hypoxia due to COVID-19 pneumonia    Subjective: On evaluation, patient tells me she is not SOB and that we are \"trying to make her SOB\" by continuously telling her this. She is adamant that breathing is not her issue and her main complaint is the intermittent cough she has been dealing with. When I asked if she had been taking her oxygen off at times, she got a bit agitated and accused the team of saying inaccurate things although this has been witnessed by staff on several occasions. She is able to talk in full sentences with no increased WOB and in fact is more so raising her voice. Her O2 sats on high flow are >90%. She denies any fevers/chills, says her abdomen hurts from the needles, no c/o SOB, cough with some thick mucous, feels hungry. Active Problem List:     Problem List  Never Reviewed        Codes Class    COVID-19 ICD-10-CM: U07.1  ICD-9-CM: 079.89               Past Medical History:     Uncontrolled DM and HTN    Past Surgical History:      has no past surgical history on file. Home Medications:     Prior to Admission medications    Medication Sig Start Date End Date Taking? Authorizing Provider   amLODIPine-benazepril (LOTREL) 10-40 mg per capsule Take 1 Capsule by mouth daily. Yes Provider, Historical   metoprolol tartrate (LOPRESSOR) 50 mg tablet Take 50 mg by mouth two (2) times a day.    Yes Provider, Historical   rosuvastatin (Crestor) 40 mg tablet Take 40 mg by mouth nightly. Yes Provider, Historical       Allergies/Social/Family History:     No Known Allergies   Social History     Tobacco Use    Smoking status: Not on file   Substance Use Topics    Alcohol use: Not on file      No family history on file. Review of Systems:     See subjective. Denies HA or confusion, no hallucinations or feelings of being dizzy, no CP or palpitations, no swelling of lower extremities, mild SOB with getting up to go to the bathroom, normal bowel habits, no calf pain. Objective:   Vital Signs:  Visit Vitals  BP (!) 147/71 (BP 1 Location: Right lower arm, BP Patient Position: At rest)   Pulse 75   Temp 98.1 °F (36.7 °C)   Resp 18   Ht 5' 5\" (1.651 m)   Wt 72.2 kg (159 lb 1.6 oz)   SpO2 97%   BMI 26.48 kg/m²    O2 Flow Rate (L/min): 25 l/min O2 Device: Hi flow nasal cannula, Heated Temp (24hrs), Av.1 °F (36.7 °C), Min:97.6 °F (36.4 °C), Max:98.7 °F (37.1 °C)           Intake/Output:     Intake/Output Summary (Last 24 hours) at 2021 1322  Last data filed at 2021 1600  Gross per 24 hour   Intake 350 ml   Output 300 ml   Net 50 ml       LABS AND  DATA: Personally reviewed  Recent Labs     21   WBC 7.8 6.4   HGB 11.9 11.5   HCT 37.1 35.0   * 373     Recent Labs     21    141   K 3.6 3.6    108   CO2 26 23   BUN 24* 20   CREA 1.09* 0.94   * 185*   CA 9.0 8.8     Recent Labs     21   AP 64 60   TP 7.3 7.2   ALB 3.0* 2.9*   GLOB 4.3* 4.3*     No results for input(s): INR, PTP, APTT, INREXT in the last 72 hours. Recent Labs     21  1841   PHI 7.46*   PCO2I 28.2*   PO2I 60*   FIO2I 94     No results for input(s): CPK, CKMB, TROIQ, BNPP in the last 72 hours. MEDS: Reviewed    Chest X-Ray: personally reviewed and report checked from     CTA Chest ()  FINDINGS:  CHEST:  THYROID: No nodule.   MEDIASTINUM: Calcified mediastinal lymph nodes.  MOHINI: Calcified left hilar lymph nodes. THORACIC AORTA: No dissection or aneurysm. MAIN PULMONARY ARTERY: There is no evidence of pulmonary embolism. TRACHEA/BRONCHI: Patent. ESOPHAGUS: No wall thickening or dilatation. HEART: Normal in size. PLEURA: No effusion or pneumothorax. LUNGS: Subpleural interstitial thickening is noted throughout both lung fields. INCIDENTALLY IMAGED UPPER ABDOMEN: No focal abnormality. BONES: Degenerative changes are seen in the thoracic spine.        IMPRESSION  Subpleural interstitial thickening throughout both lung fields may  be related to atypical viral pneumonia. No evidence of pulmonary embolism. Physical Assessment:     General:  female in no acute distress talking in bed without SOB  EENT: EOMI. Anicteric sclerae. Oral mucous moist, oropharynx benign  Neck: Supple. No lymphadenopathy. No JVD  Resp: Coarse throughout but good aeration and no rhonchi/wheezing   CV: Regular rhythm, normal rate, no murmurs, gallops, rubs. NSR on tele  GI: Soft, non distended, non tender. normoactive bowel sounds  Extremities: No edema, warm, 2+ pulses throughout. Neurologic: Moves all extremities. AAOx3, CN II-XII grossly intact  Psych: Good insight. Agitates easily but quickly able to deescalate   Skin: Good Turgor, no rashes or ulcers     Lines/Drains: PICC    Plan: At this time, Ms. Reji Martinez seems to be breathing comfortably and oxygenating well on high flow. She was counseled again by myself and Dr. Destiney Pavon regarding keeping the oxygen on and the importance of why we are doing this. In current state, [facility]. If she continues to remove the oxygen and desaturate or respiratory status worsens, please do not hesitate to call us back and we will gladly revisit. Spoke to Dr. Destiney Pavon to advise. Discussed code status/intubation with her and she affirmed that she would NOT want to be intubated; however, would be ok with CPR. Again reiterated how the 2 go hand in hand typically and she got agitated and changed the subject. From a competency standpoint, she demonstrated that she was alert and oriented x place, year, situation, president, knew her address, named her children and their phone numbers. Reassessed patient at 12 and discussed with nursing. Has only had 1 episode of desaturation to the 80s which occurred with getting OOB to bathroom. On exam she is > 92% on current oxygen settings and RR ~ 20 without use of accessory muscles. ICU team will sign off, but please do not hesitate to call us if condition changes. CRITICAL CARE CONSULTANT NOTE  I had a face to face encounter with the patient, reviewed and interpreted patient data including clinical events, labs, images, vital signs, I/O's, and examined patient. I have discussed the case and the plan and management of the patient's care with the consulting services, the bedside nurses, and the respiratory therapist.  Discussed with Dr. Funmilayo Escobedo. I personally spent 30 minutes of critical care time. This is time spent at this critically ill patient's bedside actively involved in patient care as well as the coordination of care and discussions with the patient's family. This does not include any procedural time which has been billed separately.     Evans Stevens DNP, [facility] Critical Care  7/18/2021

## 2021-07-19 ENCOUNTER — APPOINTMENT (OUTPATIENT)
Dept: NON INVASIVE DIAGNOSTICS | Age: 71
DRG: 177 | End: 2021-07-19
Attending: FAMILY MEDICINE
Payer: MEDICARE

## 2021-07-19 LAB
ALBUMIN SERPL-MCNC: 2.8 G/DL (ref 3.5–5)
ALBUMIN/GLOB SERPL: 0.7 {RATIO} (ref 1.1–2.2)
ALP SERPL-CCNC: 62 U/L (ref 45–117)
ALT SERPL-CCNC: 32 U/L (ref 12–78)
ANION GAP SERPL CALC-SCNC: 6 MMOL/L (ref 5–15)
AST SERPL-CCNC: 48 U/L (ref 15–37)
BASOPHILS # BLD: 0 K/UL (ref 0–0.1)
BASOPHILS NFR BLD: 0 % (ref 0–1)
BILIRUB SERPL-MCNC: 0.5 MG/DL (ref 0.2–1)
BUN SERPL-MCNC: 31 MG/DL (ref 6–20)
BUN/CREAT SERPL: 26 (ref 12–20)
CALCIUM SERPL-MCNC: 8.7 MG/DL (ref 8.5–10.1)
CHLORIDE SERPL-SCNC: 107 MMOL/L (ref 97–108)
CO2 SERPL-SCNC: 28 MMOL/L (ref 21–32)
CREAT SERPL-MCNC: 1.21 MG/DL (ref 0.55–1.02)
DIFFERENTIAL METHOD BLD: ABNORMAL
ECHO AO ROOT DIAM: 2.76 CM
ECHO AV AREA PEAK VELOCITY: 2.77 CM2
ECHO AV AREA/BSA PEAK VELOCITY: 1.4 CM2/M2
ECHO AV PEAK GRADIENT: 4.47 MMHG
ECHO AV PEAK VELOCITY: 105.74 CM/S
ECHO LA AREA 4C: 14.02 CM2
ECHO LA VOL 2C: 34.51 ML (ref 22–52)
ECHO LA VOL 4C: 36 ML (ref 22–52)
ECHO LA VOL BP: 37.5 ML (ref 22–52)
ECHO LA VOL/BSA BIPLANE: 19.43 ML/M2 (ref 16–28)
ECHO LA VOLUME INDEX A2C: 17.88 ML/M2 (ref 16–28)
ECHO LA VOLUME INDEX A4C: 18.65 ML/M2 (ref 16–28)
ECHO LV INTERNAL DIMENSION DIASTOLIC: 4.33 CM (ref 3.9–5.3)
ECHO LV INTERNAL DIMENSION SYSTOLIC: 2.7 CM
ECHO LV IVSD: 1.23 CM (ref 0.6–0.9)
ECHO LV MASS 2D: 193.5 G (ref 67–162)
ECHO LV MASS INDEX 2D: 100.3 G/M2 (ref 43–95)
ECHO LV POSTERIOR WALL DIASTOLIC: 1.23 CM (ref 0.6–0.9)
ECHO LVOT DIAM: 2.09 CM
ECHO LVOT PEAK GRADIENT: 2.92 MMHG
ECHO LVOT PEAK VELOCITY: 85.4 CM/S
ECHO PV PEAK INSTANTANEOUS GRADIENT SYSTOLIC: 4.42 MMHG
ECHO RV TAPSE: 2.39 CM (ref 1.5–2)
EOSINOPHIL # BLD: 0 K/UL (ref 0–0.4)
EOSINOPHIL NFR BLD: 1 % (ref 0–7)
ERYTHROCYTE [DISTWIDTH] IN BLOOD BY AUTOMATED COUNT: 14 % (ref 11.5–14.5)
GLOBULIN SER CALC-MCNC: 3.8 G/DL (ref 2–4)
GLUCOSE BLD STRIP.AUTO-MCNC: 207 MG/DL (ref 65–117)
GLUCOSE BLD STRIP.AUTO-MCNC: 227 MG/DL (ref 65–117)
GLUCOSE BLD STRIP.AUTO-MCNC: 229 MG/DL (ref 65–117)
GLUCOSE BLD STRIP.AUTO-MCNC: 274 MG/DL (ref 65–117)
GLUCOSE SERPL-MCNC: 208 MG/DL (ref 65–100)
HCT VFR BLD AUTO: 37.1 % (ref 35–47)
HGB BLD-MCNC: 11.7 G/DL (ref 11.5–16)
IMM GRANULOCYTES # BLD AUTO: 0.1 K/UL (ref 0–0.04)
IMM GRANULOCYTES NFR BLD AUTO: 1 % (ref 0–0.5)
LYMPHOCYTES # BLD: 1.2 K/UL (ref 0.8–3.5)
LYMPHOCYTES NFR BLD: 17 % (ref 12–49)
MCH RBC QN AUTO: 27.1 PG (ref 26–34)
MCHC RBC AUTO-ENTMCNC: 31.5 G/DL (ref 30–36.5)
MCV RBC AUTO: 85.9 FL (ref 80–99)
MONOCYTES # BLD: 0.4 K/UL (ref 0–1)
MONOCYTES NFR BLD: 6 % (ref 5–13)
NEUTS SEG # BLD: 5.6 K/UL (ref 1.8–8)
NEUTS SEG NFR BLD: 75 % (ref 32–75)
NRBC # BLD: 0.02 K/UL (ref 0–0.01)
NRBC BLD-RTO: 0.3 PER 100 WBC
PLATELET # BLD AUTO: 373 K/UL (ref 150–400)
PMV BLD AUTO: 12.3 FL (ref 8.9–12.9)
POTASSIUM SERPL-SCNC: 3.6 MMOL/L (ref 3.5–5.1)
PROT SERPL-MCNC: 6.6 G/DL (ref 6.4–8.2)
RBC # BLD AUTO: 4.32 M/UL (ref 3.8–5.2)
SERVICE CMNT-IMP: ABNORMAL
SODIUM SERPL-SCNC: 141 MMOL/L (ref 136–145)
WBC # BLD AUTO: 7.3 K/UL (ref 3.6–11)

## 2021-07-19 PROCEDURE — 93306 TTE W/DOPPLER COMPLETE: CPT

## 2021-07-19 PROCEDURE — 74011636637 HC RX REV CODE- 636/637: Performed by: HOSPITALIST

## 2021-07-19 PROCEDURE — 74011250637 HC RX REV CODE- 250/637: Performed by: FAMILY MEDICINE

## 2021-07-19 PROCEDURE — 77010033711 HC HIGH FLOW OXYGEN

## 2021-07-19 PROCEDURE — 82962 GLUCOSE BLOOD TEST: CPT

## 2021-07-19 PROCEDURE — 85025 COMPLETE CBC W/AUTO DIFF WBC: CPT

## 2021-07-19 PROCEDURE — 99233 SBSQ HOSP IP/OBS HIGH 50: CPT | Performed by: CLINICAL NURSE SPECIALIST

## 2021-07-19 PROCEDURE — 80053 COMPREHEN METABOLIC PANEL: CPT

## 2021-07-19 PROCEDURE — 74011250637 HC RX REV CODE- 250/637: Performed by: STUDENT IN AN ORGANIZED HEALTH CARE EDUCATION/TRAINING PROGRAM

## 2021-07-19 PROCEDURE — 74011250636 HC RX REV CODE- 250/636: Performed by: HOSPITALIST

## 2021-07-19 PROCEDURE — 74011250636 HC RX REV CODE- 250/636: Performed by: STUDENT IN AN ORGANIZED HEALTH CARE EDUCATION/TRAINING PROGRAM

## 2021-07-19 PROCEDURE — 94760 N-INVAS EAR/PLS OXIMETRY 1: CPT

## 2021-07-19 PROCEDURE — 77010033678 HC OXYGEN DAILY

## 2021-07-19 PROCEDURE — 74011250637 HC RX REV CODE- 250/637: Performed by: HOSPITALIST

## 2021-07-19 PROCEDURE — 65660000000 HC RM CCU STEPDOWN

## 2021-07-19 PROCEDURE — 36415 COLL VENOUS BLD VENIPUNCTURE: CPT

## 2021-07-19 RX ORDER — ENOXAPARIN SODIUM 100 MG/ML
40 INJECTION SUBCUTANEOUS EVERY 12 HOURS
Status: DISCONTINUED | OUTPATIENT
Start: 2021-07-19 | End: 2021-07-20

## 2021-07-19 RX ORDER — HALOPERIDOL 5 MG/ML
1 INJECTION INTRAMUSCULAR
Status: DISCONTINUED | OUTPATIENT
Start: 2021-07-19 | End: 2021-07-23 | Stop reason: HOSPADM

## 2021-07-19 RX ORDER — INSULIN GLARGINE 100 [IU]/ML
25 INJECTION, SOLUTION SUBCUTANEOUS
Status: DISCONTINUED | OUTPATIENT
Start: 2021-07-19 | End: 2021-07-20

## 2021-07-19 RX ADMIN — INSULIN LISPRO 3 UNITS: 100 INJECTION, SOLUTION INTRAVENOUS; SUBCUTANEOUS at 10:13

## 2021-07-19 RX ADMIN — DEXAMETHASONE SODIUM PHOSPHATE 6 MG: 10 INJECTION, SOLUTION INTRAMUSCULAR; INTRAVENOUS at 10:13

## 2021-07-19 RX ADMIN — Medication 5 ML: at 22:00

## 2021-07-19 RX ADMIN — INSULIN LISPRO 5 UNITS: 100 INJECTION, SOLUTION INTRAVENOUS; SUBCUTANEOUS at 12:46

## 2021-07-19 RX ADMIN — Medication 10 ML: at 07:02

## 2021-07-19 RX ADMIN — ACETAMINOPHEN 650 MG: 325 TABLET ORAL at 17:35

## 2021-07-19 RX ADMIN — INSULIN LISPRO 6 UNITS: 100 INJECTION, SOLUTION INTRAVENOUS; SUBCUTANEOUS at 17:36

## 2021-07-19 RX ADMIN — METOPROLOL TARTRATE 50 MG: 50 TABLET, FILM COATED ORAL at 10:13

## 2021-07-19 RX ADMIN — FUROSEMIDE 40 MG: 10 INJECTION, SOLUTION INTRAMUSCULAR; INTRAVENOUS at 10:13

## 2021-07-19 RX ADMIN — INSULIN GLARGINE 25 UNITS: 100 INJECTION, SOLUTION SUBCUTANEOUS at 22:12

## 2021-07-19 RX ADMIN — ENOXAPARIN SODIUM 30 MG: 30 INJECTION SUBCUTANEOUS at 07:02

## 2021-07-19 RX ADMIN — INSULIN LISPRO 3 UNITS: 100 INJECTION, SOLUTION INTRAVENOUS; SUBCUTANEOUS at 17:34

## 2021-07-19 RX ADMIN — ENOXAPARIN SODIUM 40 MG: 100 INJECTION SUBCUTANEOUS at 17:34

## 2021-07-19 RX ADMIN — OXYCODONE HYDROCHLORIDE AND ACETAMINOPHEN 500 MG: 500 TABLET ORAL at 10:13

## 2021-07-19 RX ADMIN — AMLODIPINE BESYLATE 10 MG: 5 TABLET ORAL at 10:13

## 2021-07-19 RX ADMIN — ACETAMINOPHEN 650 MG: 325 TABLET ORAL at 21:24

## 2021-07-19 RX ADMIN — INSULIN LISPRO 6 UNITS: 100 INJECTION, SOLUTION INTRAVENOUS; SUBCUTANEOUS at 12:47

## 2021-07-19 RX ADMIN — OXYCODONE HYDROCHLORIDE AND ACETAMINOPHEN 500 MG: 500 TABLET ORAL at 17:35

## 2021-07-19 RX ADMIN — Medication 10 ML: at 14:00

## 2021-07-19 RX ADMIN — INSULIN LISPRO 6 UNITS: 100 INJECTION, SOLUTION INTRAVENOUS; SUBCUTANEOUS at 10:14

## 2021-07-19 RX ADMIN — METOPROLOL TARTRATE 50 MG: 50 TABLET, FILM COATED ORAL at 21:24

## 2021-07-19 RX ADMIN — ASPIRIN 81 MG: 81 TABLET, CHEWABLE ORAL at 10:15

## 2021-07-19 RX ADMIN — INSULIN LISPRO 2 UNITS: 100 INJECTION, SOLUTION INTRAVENOUS; SUBCUTANEOUS at 22:11

## 2021-07-19 RX ADMIN — Medication 1 CAPSULE: at 10:13

## 2021-07-19 RX ADMIN — Medication 1 CAPSULE: at 10:15

## 2021-07-19 NOTE — PROGRESS NOTES
[facility] Adult  Hospitalist Group                                                                                          Hospitalist Progress Note  Lester Griffin MD  Answering service: [phone] from in house phone        Date of Service:  2021  NAME:  Guilherme Connell  :  1950  MRN:  [id number]    This documentation was facilitated by a Voice Recognition software and may contain inadvertent typographical errors. Admission Summary:   Patient presented to the ED on  with shortness of breath and fever and she was found to have Covid pneumonia. She is not vaccinated    Interval history / Subjective:        Daughter in the room sitting by the bed side(she has PPE on:double mask, face shield and gown)  Ms Jennifer Lockett talking saying she is not that sick and does not want to be here. She keeps saying she is not my daughter. This is different from yesterday where she could tell us her daughter and her son by name including her phone numbers accurately. She seems agitated,speaks in complete sentence with out evidence of dyspnea or respiratory distress. She says her son is coming and she will stay if by  his assessment he feels she needs to stay. Her daughter told me she usually listens her son better,he si coming from [place] and will be here tonight and stay here for come time. Assessment & Plan:   Acute hypoxic respiratory failure due to SARS-CoV-2 infection pneumonia  CTA no PE but findings consistent with atypical viral pneumonia  --Continue dexamethasone for 10 days or until discharge,whichever comes first.Completed Remdesivir  --Acterma x 1 dose. --No evidence of bacterial pneumonia/infetion,d/cd  emperic Abx ,  --Follow inflammatory markers. --DVT prophylaxis per Covid protocol. D-dimer 0.65 on 7/15  --On oxygen via high flow nasal cannula wean off as able to keep SPO2> 90%,patient poorly complaint with keeping the HFNC.  --Pulmonary following.    --Continue IV diuretics. Uncontrolled DM with hyperglycemia precipitated by steroid and acute illness  A1C10.3  She mentions not taking any meds currently  Increase Lantus to 25 units nightly. Added Premeal Humalog 6 units AC 3 times daily  Humalog sliding scale coverage. Elevated creatinine: Normalized  --Unknown baseline. Uncontrolled HTN:BP not on target,but fairly stable mainly in the 719-537R systolically  C/w Metoprolol, amlodipine  Hydralazine prn    She shows some sign of agitated delirium. Access: PICC line placed on 7/15     DVT ppx: Lovenox per Covid protocol  CODE STATUS: She wishes to be PARTIAL CODE. She wants CPR but NO intubation and mechanical ventilation. Isolation: Droplet plus  Family update  --discussed with her daughter Rodriguez Schwab at the bedside on 7/19. Her #[phone]        Morbid obesity  Estimated discharge date: Patient still requiring high flow oxygen. Hospital Problems  Never Reviewed        Codes Class Noted POA    COVID-19 ICD-10-CM: U07.1  ICD-9-CM: 079.89  7/13/2021 Unknown                Review of Systems:   A comprehensive review of systems was negative except for that written in the HPI. Vital Signs:    Last 24hrs VS reviewed since prior progress note. Most recent are:  Visit Vitals  /78   Pulse 65   Temp 98.7 °F (37.1 °C)   Resp 18   Ht 5' 5\" (1.651 m)   Wt 85.7 kg (189 lb)   SpO2 96%   BMI 31.45 kg/m²       No intake or output data in the 24 hours ending 07/19/21 5931     Physical Examination:     I had a face to face encounter with this patient and independently examined them on7/19/2021 as outlined below:        Constitutional:  Alert and oriented,agitatated    HEENT:  Atraumatic. Oral mucosa moist,. Non icteric sclera. No pallor. Resp:  On oxygen via high flow nasal cannula: Symmetrical air entry. Chest Wall: No deformity   CV:  Regular rhythm, normal rate, no murmurs, gallops, rubs    GI:  Soft, non distended, non tender.  normoactive bowel sounds, no hepatosplenomegaly    :  No CVA or suprapubic tenderness    Musculoskeletal:  No edema, warm, 2+ pulses throughout    Neurologic:  Mental status:AAOx3,agitated. Cranial nerves II-XII : WNL  Motor exam:Moves all extremities symmetrically              Data Review:    Review and/or order of clinical lab test  Review and/or order of tests in the radiology section of CPT  Review and/or order of tests in the medicine section of [facility]      Labs:     Recent Labs     07/19/21 0232 07/18/21 0224   WBC 7.3 7.8   HGB 11.7 11.9   HCT 37.1 37.1    411*     Recent Labs     07/19/21 0232 07/18/21 0224 07/17/21 0212    142 141   K 3.6 3.6 3.6    108 108   CO2 28 26 23   BUN 31* 24* 20   CREA 1.21* 1.09* 0.94   * 149* 185*   CA 8.7 9.0 8.8     Recent Labs     07/19/21 0232 07/18/21 0224 07/17/21 0213   ALT 32 21 16   AP 62 64 60   TBILI 0.5 0.4 0.4   TP 6.6 7.3 7.2   ALB 2.8* 3.0* 2.9*   GLOB 3.8 4.3* 4.3*     No results for input(s): INR, PTP, APTT, INREXT, INREXT in the last 72 hours. No results for input(s): FE, TIBC, PSAT, FERR in the last 72 hours. No results found for: FOL, RBCF   No results for input(s): PH, PCO2, PO2 in the last 72 hours. No results for input(s): CPK, CKNDX, TROIQ in the last 72 hours.     No lab exists for component: CPKMB  No results found for: CHOL, CHOLX, CHLST, CHOLV, HDL, HDLP, LDL, LDLC, DLDLP, TGLX, TRIGL, TRIGP, CHHD, CHHDX  Lab Results   Component Value Date/Time    Glucose (POC) 229 (H) 07/19/2021 08:03 AM    Glucose (POC) 253 (H) 07/18/2021 09:59 PM    Glucose (POC) 110 07/18/2021 06:11 PM    Glucose (POC) 228 (H) 07/18/2021 12:19 PM    Glucose (POC) 160 (H) 07/18/2021 07:24 AM     No results found for: COLOR, APPRN, SPGRU, REFSG, KEE, PROTU, GLUCU, KETU, BILU, UROU, GAUTAM, LEUKU, GLUKE, EPSU, BACTU, WBCU, RBCU, CASTS, UCRY      Medications Reviewed:     Current Facility-Administered Medications   Medication Dose Route Frequency    insulin glargine (LANTUS) injection 25 Units  25 Units SubCUTAneous QHS    enoxaparin (LOVENOX) injection 30 mg  30 mg SubCUTAneous Q12H    furosemide (LASIX) injection 40 mg  40 mg IntraVENous DAILY    L.acidophilus-paracasei-S.thermophil-bifidobacter (RISAQUAD) 8 billion cell capsule  1 Capsule Oral DAILY    insulin lispro (HUMALOG) injection 6 Units  6 Units SubCUTAneous TIDAC    insulin lispro (HUMALOG)   SubCUTAneous AC&HS    glucagon (GLUCAGEN) injection 1 mg  1 mg IntraMUSCular PRN    dextrose (D50W) injection syrg 12.5 g  25 mL IntraVENous PRN    glucose chewable tablet 12 g  3 Tablet Oral PRN    amLODIPine (NORVASC) tablet 10 mg  10 mg Oral DAILY    metoprolol tartrate (LOPRESSOR) tablet 50 mg  50 mg Oral Q12H    dexamethasone (PF) (DECADRON) 10 mg/mL injection 6 mg  6 mg IntraVENous Q24H    sodium chloride (NS) flush 5-40 mL  5-40 mL IntraVENous Q8H    sodium chloride (NS) flush 5-40 mL  5-40 mL IntraVENous PRN    acetaminophen (TYLENOL) tablet 650 mg  650 mg Oral Q4H PRN    zinc sulfate (ZINCATE) 50 mg zinc (220 mg) capsule 1 Capsule  1 Capsule Oral DAILY    ascorbic acid (vitamin C) (VITAMIN C) tablet 500 mg  500 mg Oral BID    aspirin chewable tablet 81 mg  81 mg Oral DAILY    hydrALAZINE (APRESOLINE) 20 mg/mL injection 10 mg  10 mg IntraVENous Q4H PRN     ______________________________________________________________________  EXPECTED LENGTH OF STAY: 5d 9h  ACTUAL LENGTH OF STAY:          6                 Disha Clemente MD

## 2021-07-19 NOTE — DIABETES MGMT
[address] [facility]    CLINICAL NURSE SPECIALIST CONSULT     Initial Presentation   Madiha Anaya is a 79 y.o. female who presented to the ED 21 with a 1 week c/o of shortness of breath and generalized myalgias and fatigue. She did test positive for COVID-19 at PCP office a few days prior to presentation. Upon EMS arrival, she was hypoxic with O2 sats in the 80s. HX: HTN, Pre-diabetes    DX: Acute hypoxic respiratory failure s/t COVID-19 pneumonia    Treatment plan     TX: IV antibiotics, Vit C, Zinc, decadron, remdesivir    Hospital course   Clinical progress has been complicated by steroid induced hyperglycemia. Diabetes    Patient has unknown Type 2 diabetes, untreated PTA. Family history positive for diabetes. Mother, Niece, Sister and Brother      Admission  and A1c 10.3% indicate poor diabetes control. Ambulatory blood glucose management provided by primary care provider: Daniela Baker MD.    Consulted by Felix Butt MD for advanced diabetes nursing assessment and care, specifically related to   [x] Inpatient management strategy  [x] Home management assessment      Subjective   Whatever happens, happens. There is nothing you can do about it. I am not worried about it. There is not going to be a follow-up with me.     Lives independently   PCP is Dr Ayaz Oconnor   Has a daughter and son  Patient reports the following home diabetes self-care practices:  Eating pattern  \"I cant tell you exactly what I eat.   I eat plenty of food\"    Social determinants of health impacting diabetes self-management practices   Concerned that you need to know more about how to stay healthy with diabetes     CT with atypical pneumonia  Admitting A1C 10.3%, admitting   GFR 44-59 this admission  CRP 1.56 (peaked at 12)    Fasting B  Pre-prandial B/110/228  On decadron 6mg daily    Lantus: 20 units daily  Humalo units with meals  Correctional: 11 units in the last 24h  Objective   Physical exam  General   Neuro  Awake, alert, spoke with patient over the phone. Vital Signs   Visit Vitals  /83   Pulse 72   Temp 98.1 °F (36.7 °C)   Resp 23   Ht 5' 5\" (1.651 m)   Wt 85.7 kg (189 lb)   SpO2 95%   BMI 31.45 kg/m²     Deferred PE due to active COVID-19 infection    Laboratory      CBC WITH AUTOMATED DIFF    Collection Time: 07/19/21  2:32 AM   Result Value Ref Range    WBC 7.3 3.6 - 11.0 K/uL    RBC 4.32 3.80 - 5.20 M/uL    HGB 11.7 11.5 - 16.0 g/dL    HCT 37.1 35.0 - 47.0 %    MCV 85.9 80.0 - 99.0 FL    MCH 27.1 26.0 - 34.0 PG    MCHC 31.5 30.0 - 36.5 g/dL    RDW 14.0 11.5 - 14.5 %    PLATELET 246 603 - 856 K/uL    MPV 12.3 8.9 - 12.9 FL    NRBC 0.3 (H) 0  WBC    ABSOLUTE NRBC 0.02 (H) 0.00 - 0.01 K/uL    NEUTROPHILS 75 32 - 75 %    LYMPHOCYTES 17 12 - 49 %    MONOCYTES 6 5 - 13 %    EOSINOPHILS 1 0 - 7 %    BASOPHILS 0 0 - 1 %    IMMATURE GRANULOCYTES 1 (H) 0.0 - 0.5 %    ABS. NEUTROPHILS 5.6 1.8 - 8.0 K/UL    ABS. LYMPHOCYTES 1.2 0.8 - 3.5 K/UL    ABS. MONOCYTES 0.4 0.0 - 1.0 K/UL    ABS. EOSINOPHILS 0.0 0.0 - 0.4 K/UL    ABS. BASOPHILS 0.0 0.0 - 0.1 K/UL    ABS. IMM. GRANS. 0.1 (H) 0.00 - 0.04 K/UL    DF AUTOMATED           METABOLIC PANEL, COMPREHENSIVE    Collection Time: 07/19/21  2:32 AM   Result Value Ref Range    Sodium 141 136 - 145 mmol/L    Potassium 3.6 3.5 - 5.1 mmol/L    Chloride 107 97 - 108 mmol/L    CO2 28 21 - 32 mmol/L    Anion gap 6 5 - 15 mmol/L    Glucose 208 (H) 65 - 100 mg/dL    BUN 31 (H) 6 - 20 MG/DL    Creatinine 1.21 (H) 0.55 - 1.02 MG/DL    BUN/Creatinine ratio 26 (H) 12 - 20      GFR est AA 53 (L) >60 ml/min/1.73m2    GFR est non-AA 44 (L) >60 ml/min/1.73m2    Calcium 8.7 8.5 - 10.1 MG/DL    Bilirubin, total 0.5 0.2 - 1.0 MG/DL    ALT (SGPT) 32 12 - 78 U/L    AST (SGOT) 48 (H) 15 - 37 U/L    Alk.  phosphatase 62 45 - 117 U/L    Protein, total 6.6 6.4 - 8.2 g/dL    Albumin 2.8 (L) 3.5 - 5.0 g/dL    Globulin 3.8 2.0 - 4.0 g/dL    A-G Ratio 0.7 (L) 1.1 - 2.2         Factors impacting BG management  Factor Dose Comments   Nutrition:  Carb-controlled meals     60 grams/meal      Drugs:  Steroids     Decadron 6mg daily      Blood glucose pattern        Assessment and Plan   Nursing Diagnosis Risk for unstable blood glucose pattern   Nursing Intervention Domain 7247 Decision-making Support   Nursing Interventions Examined current inpatient diabetes control   Explored factors facilitating and impeding inpatient management  Identified self-management practices impeding diabetes control  Explored corrective strategies with patient and responsible inpatient provider   Informed patient of rational for insulin strategy while hospitalized  Instructed patient in:    Diabetes Education Checklist    Pathophysiology  [x] Diabetes basics  [x] Differences between type 1 and type 2    Diagnostic Criteria  [x] Interpretation of Labs  [x] Hemoglobin A1c  [x] Blood glucose goals  Notes: Goal A1C 7%, patients A1C 10.3%  Goal glucose under 200    Blood Glucose Monitoring  [x] Using a glucometer  [x] When to check BG  [x] Record keeping  Notes: Discussed they will need to obtain a meter, lancets and strips. Patient instructed to obtain a glucose reading before meals and bedtime and if the \"don't feel right\"  Patient to create a log of blood sugar readings and present to their PCP for long term management. Reducing Risks  [x] Long-term complications of diabetes     Evaluation   Bipin Renteria is a 79year old female who was admitted with acute hypoxic respiratory failure s/t COVID-19 pneumonia. Glucose on admission elevated at 324 and A1C 10.3% indicating a new diagnosis of Type 2 Diabetes. During this hospitalization, she was started on IV antibiotics, IV decadron, and remdesivir. Moderate lantus and humalog were stared this admission, but the patient has not achieved inpatient blood glucose target of 100-180mg/dl.  Several factors have played a role in blood glucose management including:  [x] Critical nature of illness state  [x]  Changing nutritive sources & needs   [x] Glucocorticoid use    The Subcutaneous Insulin Order set (1431) has been in use but not sufficient to to override steroid impact. Hence, the next step in optimizing blood glucose control would be    [x]  Optimize basal and bolus insulin dosing     Unfortunately, patient is a very poor historian and has poor judgement. At this time, she has been counseled in diagnosis of diabetes but she is very resistant to acknowledge diagnosis and/or treatment options. I do think she is a little confused today and will continue to address during this admission. Recommendations   1. POC glucose ACHS  2. Consistent carbohydrate diet (60 grams CHO/meal)  3. Adjust the Subcutaneous Insulin Order set (2997)  Insulin Dosing Specific recommendation   Basal                                      (Based on weight, BMI & GFR) 35 units Lantus once daily    Nutritional                                      (Based on CHO/dextrose load) 8 units Humalog/meal    Corrective                                       (Useful in adjusting insulin dosing) [x] Insulin-resistant sensitivity        Discharge Planning   1. Will need a FUV with PCP within 1-2 weeks after hospital discharge for ongoing diabetes management     2. Start Metformin 1000mg daily    3. Start Amaryl 2mg once daily    4. Prescription for glucometer kit (Meter, Lancets (100), Strips (100)). Patient to obtain a blood glucose reading four times daily. First thing in the morning prior to eating and drinking anything then before lunch, dinner and bedtime. Create a log and present to PCP for interpretation. Patient very resistant to taking medication for treating diabetes.    Billing Code(s)   [x] 96069     Before making these care recommendations, I personally reviewed the hosptialization record, including laboratory and diagnostic data, medications and examined the patient at bedside (circumstances permitting).   Total minutes: 28    Vj Herrera CNS  Diabetes Clinical Nurse Specialist  [facility]  Access via [employer]

## 2021-07-19 NOTE — PROGRESS NOTES
2000 - Report received from LEDY Cardoso. VSS, PRN tylenol given for c/o HA. Daughter at beside, plan of care reviewed. CHG bath offered, pt refused. Bedside and Verbal shift change report given to [facility] (oncoming nurse) by Kaleb Camp (offgoing nurse). Report included the following information SBAR, Kardex, Intake/Output, MAR, Accordion, Recent Results, Cardiac Rhythm -NSR and Alarm Parameters .

## 2021-07-20 ENCOUNTER — APPOINTMENT (OUTPATIENT)
Dept: GENERAL RADIOLOGY | Age: 71
DRG: 177 | End: 2021-07-20
Attending: HOSPITALIST
Payer: MEDICARE

## 2021-07-20 LAB
ALBUMIN SERPL-MCNC: 2.8 G/DL (ref 3.5–5)
ALBUMIN/GLOB SERPL: 0.7 {RATIO} (ref 1.1–2.2)
ALP SERPL-CCNC: 58 U/L (ref 45–117)
ALT SERPL-CCNC: 38 U/L (ref 12–78)
ANION GAP SERPL CALC-SCNC: 8 MMOL/L (ref 5–15)
AST SERPL-CCNC: 41 U/L (ref 15–37)
BASOPHILS # BLD: 0 K/UL (ref 0–0.1)
BASOPHILS NFR BLD: 0 % (ref 0–1)
BILIRUB SERPL-MCNC: 0.4 MG/DL (ref 0.2–1)
BUN SERPL-MCNC: 31 MG/DL (ref 6–20)
BUN/CREAT SERPL: 26 (ref 12–20)
CALCIUM SERPL-MCNC: 8.5 MG/DL (ref 8.5–10.1)
CHLORIDE SERPL-SCNC: 104 MMOL/L (ref 97–108)
CO2 SERPL-SCNC: 23 MMOL/L (ref 21–32)
CREAT SERPL-MCNC: 1.19 MG/DL (ref 0.55–1.02)
DIFFERENTIAL METHOD BLD: ABNORMAL
EOSINOPHIL # BLD: 0 K/UL (ref 0–0.4)
EOSINOPHIL NFR BLD: 0 % (ref 0–7)
ERYTHROCYTE [DISTWIDTH] IN BLOOD BY AUTOMATED COUNT: 14.3 % (ref 11.5–14.5)
GLOBULIN SER CALC-MCNC: 3.8 G/DL (ref 2–4)
GLUCOSE BLD STRIP.AUTO-MCNC: 202 MG/DL (ref 65–117)
GLUCOSE BLD STRIP.AUTO-MCNC: 208 MG/DL (ref 65–117)
GLUCOSE BLD STRIP.AUTO-MCNC: 209 MG/DL (ref 65–117)
GLUCOSE BLD STRIP.AUTO-MCNC: 252 MG/DL (ref 65–117)
GLUCOSE BLD STRIP.AUTO-MCNC: 538 MG/DL (ref 65–117)
GLUCOSE SERPL-MCNC: 271 MG/DL (ref 65–100)
HCT VFR BLD AUTO: 36.1 % (ref 35–47)
HGB BLD-MCNC: 12 G/DL (ref 11.5–16)
IMM GRANULOCYTES # BLD AUTO: 0.1 K/UL (ref 0–0.04)
IMM GRANULOCYTES NFR BLD AUTO: 1 % (ref 0–0.5)
LYMPHOCYTES # BLD: 0.8 K/UL (ref 0.8–3.5)
LYMPHOCYTES NFR BLD: 8 % (ref 12–49)
MCH RBC QN AUTO: 27.9 PG (ref 26–34)
MCHC RBC AUTO-ENTMCNC: 33.2 G/DL (ref 30–36.5)
MCV RBC AUTO: 84 FL (ref 80–99)
MONOCYTES # BLD: 0.7 K/UL (ref 0–1)
MONOCYTES NFR BLD: 6 % (ref 5–13)
NEUTS SEG # BLD: 9.2 K/UL (ref 1.8–8)
NEUTS SEG NFR BLD: 85 % (ref 32–75)
NRBC # BLD: 0 K/UL (ref 0–0.01)
NRBC BLD-RTO: 0 PER 100 WBC
PLATELET # BLD AUTO: 398 K/UL (ref 150–400)
PMV BLD AUTO: 12.4 FL (ref 8.9–12.9)
POTASSIUM SERPL-SCNC: 3.7 MMOL/L (ref 3.5–5.1)
PROT SERPL-MCNC: 6.6 G/DL (ref 6.4–8.2)
RBC # BLD AUTO: 4.3 M/UL (ref 3.8–5.2)
SERVICE CMNT-IMP: ABNORMAL
SODIUM SERPL-SCNC: 135 MMOL/L (ref 136–145)
WBC # BLD AUTO: 10.8 K/UL (ref 3.6–11)

## 2021-07-20 PROCEDURE — 74011250637 HC RX REV CODE- 250/637: Performed by: STUDENT IN AN ORGANIZED HEALTH CARE EDUCATION/TRAINING PROGRAM

## 2021-07-20 PROCEDURE — 71045 X-RAY EXAM CHEST 1 VIEW: CPT

## 2021-07-20 PROCEDURE — 74011636637 HC RX REV CODE- 636/637: Performed by: HOSPITALIST

## 2021-07-20 PROCEDURE — 74011250637 HC RX REV CODE- 250/637: Performed by: FAMILY MEDICINE

## 2021-07-20 PROCEDURE — 74011250636 HC RX REV CODE- 250/636: Performed by: STUDENT IN AN ORGANIZED HEALTH CARE EDUCATION/TRAINING PROGRAM

## 2021-07-20 PROCEDURE — 74011250637 HC RX REV CODE- 250/637: Performed by: HOSPITALIST

## 2021-07-20 PROCEDURE — 99231 SBSQ HOSP IP/OBS SF/LOW 25: CPT | Performed by: CLINICAL NURSE SPECIALIST

## 2021-07-20 PROCEDURE — 82962 GLUCOSE BLOOD TEST: CPT

## 2021-07-20 PROCEDURE — 74011250636 HC RX REV CODE- 250/636: Performed by: HOSPITALIST

## 2021-07-20 PROCEDURE — 36415 COLL VENOUS BLD VENIPUNCTURE: CPT

## 2021-07-20 PROCEDURE — 77010033711 HC HIGH FLOW OXYGEN

## 2021-07-20 PROCEDURE — 65660000000 HC RM CCU STEPDOWN

## 2021-07-20 PROCEDURE — 85025 COMPLETE CBC W/AUTO DIFF WBC: CPT

## 2021-07-20 PROCEDURE — 80053 COMPREHEN METABOLIC PANEL: CPT

## 2021-07-20 RX ORDER — INSULIN GLARGINE 100 [IU]/ML
35 INJECTION, SOLUTION SUBCUTANEOUS
Status: DISCONTINUED | OUTPATIENT
Start: 2021-07-20 | End: 2021-07-23 | Stop reason: HOSPADM

## 2021-07-20 RX ORDER — INSULIN LISPRO 100 [IU]/ML
8 INJECTION, SOLUTION INTRAVENOUS; SUBCUTANEOUS
Status: DISCONTINUED | OUTPATIENT
Start: 2021-07-20 | End: 2021-07-23 | Stop reason: HOSPADM

## 2021-07-20 RX ORDER — ENOXAPARIN SODIUM 100 MG/ML
30 INJECTION SUBCUTANEOUS EVERY 12 HOURS
Status: DISCONTINUED | OUTPATIENT
Start: 2021-07-20 | End: 2021-07-23 | Stop reason: HOSPADM

## 2021-07-20 RX ADMIN — Medication 5 ML: at 06:00

## 2021-07-20 RX ADMIN — FUROSEMIDE 40 MG: 10 INJECTION, SOLUTION INTRAMUSCULAR; INTRAVENOUS at 10:28

## 2021-07-20 RX ADMIN — ENOXAPARIN SODIUM 30 MG: 30 INJECTION SUBCUTANEOUS at 18:06

## 2021-07-20 RX ADMIN — HUMAN INSULIN 10 UNITS: 100 INJECTION, SUSPENSION SUBCUTANEOUS at 10:28

## 2021-07-20 RX ADMIN — INSULIN LISPRO 8 UNITS: 100 INJECTION, SOLUTION INTRAVENOUS; SUBCUTANEOUS at 18:05

## 2021-07-20 RX ADMIN — INSULIN GLARGINE 35 UNITS: 100 INJECTION, SOLUTION SUBCUTANEOUS at 22:44

## 2021-07-20 RX ADMIN — Medication 10 ML: at 18:06

## 2021-07-20 RX ADMIN — METOPROLOL TARTRATE 50 MG: 50 TABLET, FILM COATED ORAL at 10:30

## 2021-07-20 RX ADMIN — ASPIRIN 81 MG: 81 TABLET, CHEWABLE ORAL at 10:30

## 2021-07-20 RX ADMIN — Medication 1 CAPSULE: at 10:30

## 2021-07-20 RX ADMIN — OXYCODONE HYDROCHLORIDE AND ACETAMINOPHEN 500 MG: 500 TABLET ORAL at 10:30

## 2021-07-20 RX ADMIN — AMLODIPINE BESYLATE 10 MG: 5 TABLET ORAL at 10:30

## 2021-07-20 RX ADMIN — Medication 10 ML: at 22:59

## 2021-07-20 RX ADMIN — INSULIN LISPRO 5 UNITS: 100 INJECTION, SOLUTION INTRAVENOUS; SUBCUTANEOUS at 18:05

## 2021-07-20 RX ADMIN — INSULIN LISPRO 3 UNITS: 100 INJECTION, SOLUTION INTRAVENOUS; SUBCUTANEOUS at 11:30

## 2021-07-20 RX ADMIN — DEXAMETHASONE SODIUM PHOSPHATE 6 MG: 10 INJECTION, SOLUTION INTRAMUSCULAR; INTRAVENOUS at 10:28

## 2021-07-20 RX ADMIN — METOPROLOL TARTRATE 50 MG: 50 TABLET, FILM COATED ORAL at 20:53

## 2021-07-20 RX ADMIN — HALOPERIDOL LACTATE 1 MG: 5 INJECTION, SOLUTION INTRAMUSCULAR at 08:46

## 2021-07-20 RX ADMIN — INSULIN LISPRO 2 UNITS: 100 INJECTION, SOLUTION INTRAVENOUS; SUBCUTANEOUS at 22:58

## 2021-07-20 RX ADMIN — ENOXAPARIN SODIUM 40 MG: 100 INJECTION SUBCUTANEOUS at 06:00

## 2021-07-20 RX ADMIN — INSULIN LISPRO 8 UNITS: 100 INJECTION, SOLUTION INTRAVENOUS; SUBCUTANEOUS at 10:29

## 2021-07-20 RX ADMIN — OXYCODONE HYDROCHLORIDE AND ACETAMINOPHEN 500 MG: 500 TABLET ORAL at 18:06

## 2021-07-20 NOTE — DIABETES MGMT
[address] [facility]    CLINICAL NURSE SPECIALIST CONSULT   FOLLOW UP NOTE    Initial Presentation   Ann Samaniego is a 79 y.o. female who presented to the ED 7/13/21 with a 1 week c/o of shortness of breath and generalized myalgias and fatigue. She did test positive for COVID-19 at PCP office a few days prior to presentation. Upon EMS arrival, she was hypoxic with O2 sats in the 80s. HX: HTN, Pre-diabetes    DX: Acute hypoxic respiratory failure s/t COVID-19 pneumonia    Treatment plan     TX: IV antibiotics, Vit C, Zinc, decadron, remdesivir    Hospital course   Clinical progress has been complicated by steroid induced hyperglycemia. 7/13/21: Hospital admission  7/20/21: Patient hypoxic with disorientation, attempting to leave- Code [place]  Diabetes    Patient has unknown Type 2 diabetes, untreated PTA. Family history positive for diabetes. Mother, Niece, Sister and Brother      Admission  and A1c 10.3% indicate poor diabetes control. Ambulatory blood glucose management provided by primary care provider: Zuri Spears MD.    Consulted by Derian Vidal MD for advanced diabetes nursing assessment and care, specifically related to   [x] Inpatient management strategy  [x] Home management assessment      Subjective   Whatever happens, happens. There is nothing you can do about it. I am not worried about it. There is not going to be a follow-up with me.     Lives independently   PCP is Dr Mira Tovar   Has a daughter and son  Patient reports the following home diabetes self-care practices:  Eating pattern  \"I cant tell you exactly what I eat.   I eat plenty of food\"    Social determinants of health impacting diabetes self-management practices   Concerned that you need to know more about how to stay healthy with diabetes     CT with atypical pneumonia  Admitting A1C 10.3%, admitting   GFR 44-59 this admission  CRP 1.56 (peaked at 12)  Patient hypoxic with disorientation, attempting to leave- Code [place] now    Fasting B/229  Pre-prandial B-274  On decadron 6mg daily    Lantus: 25 units daily  Humalo units with meals  Correctional: 13 units in the last 24h  Objective   Physical exam  General   Neuro  Not able to speak with patient, currently hypoxic in a Code [place]  Vital Signs   Visit Vitals  BP (!) 154/72   Pulse 95   Temp 98 °F (36.7 °C)   Resp 17   Ht 5' 5\" (1.651 m)   Wt 75.8 kg (167 lb)   SpO2 92%   BMI 27.79 kg/m²     Deferred PE due to active COVID-19 infection    Laboratory      CBC WITH AUTOMATED DIFF    Collection Time: 21 12:24 AM   Result Value Ref Range    WBC 10.8 3.6 - 11.0 K/uL    RBC 4.30 3.80 - 5.20 M/uL    HGB 12.0 11.5 - 16.0 g/dL    HCT 36.1 35.0 - 47.0 %    MCV 84.0 80.0 - 99.0 FL    MCH 27.9 26.0 - 34.0 PG    MCHC 33.2 30.0 - 36.5 g/dL    RDW 14.3 11.5 - 14.5 %    PLATELET 215 699 - 036 K/uL    MPV 12.4 8.9 - 12.9 FL    NRBC 0.0 0  WBC    ABSOLUTE NRBC 0.00 0.00 - 0.01 K/uL    NEUTROPHILS 85 (H) 32 - 75 %    LYMPHOCYTES 8 (L) 12 - 49 %    MONOCYTES 6 5 - 13 %    EOSINOPHILS 0 0 - 7 %    BASOPHILS 0 0 - 1 %    IMMATURE GRANULOCYTES 1 (H) 0.0 - 0.5 %    ABS. NEUTROPHILS 9.2 (H) 1.8 - 8.0 K/UL    ABS. LYMPHOCYTES 0.8 0.8 - 3.5 K/UL    ABS. MONOCYTES 0.7 0.0 - 1.0 K/UL    ABS. EOSINOPHILS 0.0 0.0 - 0.4 K/UL    ABS. BASOPHILS 0.0 0.0 - 0.1 K/UL    ABS. IMM.  GRANS. 0.1 (H) 0.00 - 0.04 K/UL    DF AUTOMATED           METABOLIC PANEL, COMPREHENSIVE    Collection Time: 21 12:24 AM   Result Value Ref Range    Sodium 135 (L) 136 - 145 mmol/L    Potassium 3.7 3.5 - 5.1 mmol/L    Chloride 104 97 - 108 mmol/L    CO2 23 21 - 32 mmol/L    Anion gap 8 5 - 15 mmol/L    Glucose 271 (H) 65 - 100 mg/dL    BUN 31 (H) 6 - 20 MG/DL    Creatinine 1.19 (H) 0.55 - 1.02 MG/DL    BUN/Creatinine ratio 26 (H) 12 - 20      GFR est AA 54 (L) >60 ml/min/1.73m2    GFR est non-AA 45 (L) >60 ml/min/1.73m2    Calcium 8.5 8.5 - 10.1 MG/DL    Bilirubin, total 0.4 0.2 - 1.0 MG/DL    ALT (SGPT) 38 12 - 78 U/L    AST (SGOT) 41 (H) 15 - 37 U/L    Alk. phosphatase 58 45 - 117 U/L    Protein, total 6.6 6.4 - 8.2 g/dL    Albumin 2.8 (L) 3.5 - 5.0 g/dL    Globulin 3.8 2.0 - 4.0 g/dL    A-G Ratio 0.7 (L) 1.1 - 2.2         Factors impacting BG management  Factor Dose Comments   Nutrition:  Carb-controlled meals     60 grams/meal      Drugs:  Steroids     Decadron 6mg daily      Blood glucose pattern        Assessment and Plan   Nursing Diagnosis Risk for unstable blood glucose pattern   Nursing Intervention Domain 4445 Decision-making Support   Nursing Interventions Examined current inpatient diabetes control   Explored factors facilitating and impeding inpatient management  Identified self-management practices impeding diabetes control  Explored corrective strategies with patient and responsible inpatient provider   Informed patient of rational for insulin strategy while hospitalized  Instructed patient in:    Diabetes Education Checklist    Pathophysiology  [x] Diabetes basics  [x] Differences between type 1 and type 2    Diagnostic Criteria  [x] Interpretation of Labs  [x] Hemoglobin A1c  [x] Blood glucose goals  Notes: Goal A1C 7%, patients A1C 10.3%  Goal glucose under 200    Blood Glucose Monitoring  [x] Using a glucometer  [x] When to check BG  [x] Record keeping  Notes: Discussed they will need to obtain a meter, lancets and strips. Patient instructed to obtain a glucose reading before meals and bedtime and if the \"don't feel right\"  Patient to create a log of blood sugar readings and present to their PCP for long term management. Reducing Risks  [x] Long-term complications of diabetes     Evaluation   Zac Montiel is a 79year old female who was admitted with acute hypoxic respiratory failure s/t COVID-19 pneumonia. Glucose on admission elevated at 324 and A1C 10.3% indicating a new diagnosis of Type 2 Diabetes.   During this hospitalization, she was started on IV antibiotics, IV decadron, and remdesivir. Moderate lantus and humalog were stared this admission, but the patient has not achieved inpatient blood glucose target of 100-180mg/dl. Several factors have played a role in blood glucose management including:  [x] Critical nature of illness state  [x]  Changing nutritive sources & needs   [x] Glucocorticoid use    The Subcutaneous Insulin Order set (8537) has been in use but not sufficient to to override steroid impact. Hence, the next step in optimizing blood glucose control would be    [x]  Optimize basal and bolus insulin dosing     Unfortunately, patient is a very poor historian and has poor judgement. At this time, she has been counseled in diagnosis of diabetes but she is very resistant to acknowledge diagnosis and/or treatment options. She still remains confused today and will continue to address with her as her mental status improves. Recommendations   1. POC glucose ACHS  2. Consistent carbohydrate diet (60 grams CHO/meal)  3. Adjust the Subcutaneous Insulin Order set (6784)  Insulin Dosing Specific recommendation   Basal                                      (Based on weight, BMI & GFR) 35 units Lantus once daily    Nutritional                                      (Based on CHO/dextrose load) 8 units Humalog/meal    Corrective                                       (Useful in adjusting insulin dosing) [x] Insulin-resistant sensitivity        Discharge Planning   1. Will need a FUV with PCP within 1-2 weeks after hospital discharge for ongoing diabetes management     2. Start Metformin 1000mg daily    3. Start Amaryl 2mg once daily    4. Prescription for glucometer kit (Meter, Lancets (100), Strips (100)). Patient to obtain a blood glucose reading four times daily. First thing in the morning prior to eating and drinking anything then before lunch, dinner and bedtime. Create a log and present to PCP for interpretation.       Patient very resistant to taking medication for treating diabetes. Billing Code(s)   [x] 08265     Before making these care recommendations, I personally reviewed the hosptialization record, including laboratory and diagnostic data, medications and examined the patient at bedside (circumstances permitting).   Total minutes: 28    RANDY Prabhakar  Diabetes Clinical Nurse Specialist  [facility]  Access via [employer]

## 2021-07-20 NOTE — PROGRESS NOTES
0000 Assumed care for patient, received report from Sari 191 Pt O2 saturation in the 70-80's, RN went into patient room and pt was upset and \"wanted to go home and be left alone\" and refusing to put oxygen back on. Charge RN notified and came to speak with pt about the importance of leaving her oxygen on. After several minutes she agreed to put oxygen back on and oxygen saturation came up to 95%. Provider notified, will continue to monitor at this time. 0400 Patient got up to bedside commode by herself, o2 saturation fell into the 70's, RN assisted patient with putting high flow nasal canula back on and getting back into the bed. Took pt 15 minutes to recover then agreed to stay in the bed with HFNC on for the rest of the night and \"will follow the treatment plan for now but leave in the morning\" Patient now resting comfortably in bed with 02 saturation in the 90's. RT assessed patient as well.

## 2021-07-20 NOTE — PROGRESS NOTES
Bedside shift change report given to Eduar Everett RN (oncoming nurse) by [facility] RN (offgoing nurse). Report included the following information SBAR, Kardex, Intake/Output, MAR and Recent Results. Problem: Falls - Risk of  Goal: *Absence of Falls  Description: Document Usama Sites Fall Risk and appropriate interventions in the flowsheet. Outcome: Progressing Towards Goal  Note: Fall Risk Interventions:  Mobility Interventions: Bed/chair exit alarm    Mentation Interventions: Bed/chair exit alarm    Medication Interventions: Bed/chair exit alarm    Elimination Interventions: Call light in reach    History of Falls Interventions: Bed/chair exit alarm         Problem: Gas Exchange - Impaired  Goal: Absence of hypoxia  Outcome: Progressing Towards Goal  Goal: Promote optimal lung function  Outcome: Progressing Towards Goal     Problem: Breathing Pattern - Ineffective  Goal: Ability to achieve and maintain a regular respiratory rate  Outcome: Progressing Towards Goal     Problem: Nutrition Deficits  Goal: Optimize nutrtional status  Outcome: Progressing Towards Goal     Problem: Fatigue  Goal: Verbalize increase energy and improved vitality  Outcome: Progressing Towards Goal     Problem: Pressure Injury - Risk of  Goal: *Prevention of pressure injury  Description: Document Alexys Scale and appropriate interventions in the flowsheet.   Outcome: Progressing Towards Goal  Note: Pressure Injury Interventions:  Sensory Interventions: Minimize linen layers    Moisture Interventions: Check for incontinence Q2 hours and as needed    Activity Interventions: Pressure redistribution bed/mattress(bed type)    Mobility Interventions: HOB 30 degrees or less    Nutrition Interventions: Document food/fluid/supplement intake    Friction and Shear Interventions: HOB 30 degrees or less

## 2021-07-20 NOTE — PROGRESS NOTES
Miss Adore Morrison became combative, agitated and aggressive when PCT Pie went in to assess her blood glucose levels. Per pt she is not being cared for and have not had a shower since Tuesday last week. Pt was also very confused and had a fist shape of her left hand. Said she will \"knock out staff\". RN attempted to speak to pt and redirect behavior, pt has poor command following and impaired decision making. Pt pulled off HFNC-SPO2 82%, -BPM.   Since pt would not follow commands, RN sought assistance from additional staff including resource  nurse and code atlas was paged over head. Pt had Haldol 1 mg IV per orders for agitation. Resource RN Otilia Olmstead also came in to provide assistance and Sat with pt until behavior improved,  in room providing assistance. RN called pt's son, pt refused to speak to her son. Pt eventually returned in bed, independently, then requested ginger ale and ice water. Both were provided. Dr. Mode Dee was notified by charge LEDY Mckeon.

## 2021-07-20 NOTE — PROGRESS NOTES
TRANSITION OF CARE  RUR--17%  Disposition--TBD. Probably home with home health. [facility] patient. Covid Positive 7/13/21. Currently on high flow oxygen. Transport--TBD. Probable family    Patient's primary family contact: daughter Vincent Irvin [phone]. Care Management Interventions  PCP Verified by CM: Yes  Transition of Care Consult (CM Consult): [address]: No  Physical Therapy Consult: No  Occupational Therapy Consult: No  Speech Therapy Consult: No  Current Support Network: Lives Alone  Confirm Follow Up Transport: Family  The Plan for Transition of Care is Related to the Following Treatment Goals : TBD  Discharge Location  Discharge Placement: Other: (TBD)    Reason for Admission:  Acute hypoxic respiratory failure due to Covid PNA            CTA findings consistent with atypical viral PNA             Uncontrolled DM             Elevated creatinine-normalized             Uncontrolled HTN                    RUR Score:            17%           Plan for utilizing home health:    TBD      PCP: First and Last name:  Michelle Anderson MD   Name of Practice:    Are you a current patient: Yes    Approximate date of last visit: unknown   Can you participate in a virtual visit with your PCP:                     Current Advanced Directive/Advance Care Plan: Partial Code      Healthcare Decision Maker:   Click here to complete [address] including selection of the Healthcare Decision Maker Relationship (ie \"Primary\")                         Transition of Care Plan:                    CM unable to meet with patient who is on covid isolation and is on high flow oxygen. CM met with daughter and son on the unit. Patient lives alone in a 2 story house with steps in and interior steps. . Patient has a supportive daughter locally and a supportive son who is visiting from [place]. Patient was ambulatory rior to admission.  Patient 's daughter confirmed PCP, health insurance, and prescription coverage.    Previous home health : None  Previous IPR/ SNF rehab :None  DME : None

## 2021-07-21 LAB
ALBUMIN SERPL-MCNC: 2.9 G/DL (ref 3.5–5)
ALBUMIN/GLOB SERPL: 0.8 {RATIO} (ref 1.1–2.2)
ALP SERPL-CCNC: 64 U/L (ref 45–117)
ALT SERPL-CCNC: 45 U/L (ref 12–78)
ANION GAP SERPL CALC-SCNC: 8 MMOL/L (ref 5–15)
AST SERPL-CCNC: 45 U/L (ref 15–37)
BASOPHILS # BLD: 0 K/UL (ref 0–0.1)
BASOPHILS NFR BLD: 0 % (ref 0–1)
BILIRUB SERPL-MCNC: 0.5 MG/DL (ref 0.2–1)
BUN SERPL-MCNC: 32 MG/DL (ref 6–20)
BUN/CREAT SERPL: 31 (ref 12–20)
CALCIUM SERPL-MCNC: 9.1 MG/DL (ref 8.5–10.1)
CHLORIDE SERPL-SCNC: 103 MMOL/L (ref 97–108)
CO2 SERPL-SCNC: 28 MMOL/L (ref 21–32)
CREAT SERPL-MCNC: 1.02 MG/DL (ref 0.55–1.02)
DIFFERENTIAL METHOD BLD: ABNORMAL
EOSINOPHIL # BLD: 0 K/UL (ref 0–0.4)
EOSINOPHIL NFR BLD: 0 % (ref 0–7)
ERYTHROCYTE [DISTWIDTH] IN BLOOD BY AUTOMATED COUNT: 14.3 % (ref 11.5–14.5)
GLOBULIN SER CALC-MCNC: 3.8 G/DL (ref 2–4)
GLUCOSE BLD STRIP.AUTO-MCNC: 133 MG/DL (ref 65–117)
GLUCOSE BLD STRIP.AUTO-MCNC: 142 MG/DL (ref 65–117)
GLUCOSE BLD STRIP.AUTO-MCNC: 274 MG/DL (ref 65–117)
GLUCOSE BLD STRIP.AUTO-MCNC: 277 MG/DL (ref 65–117)
GLUCOSE BLD STRIP.AUTO-MCNC: 290 MG/DL (ref 65–117)
GLUCOSE SERPL-MCNC: 76 MG/DL (ref 65–100)
HCT VFR BLD AUTO: 36.3 % (ref 35–47)
HGB BLD-MCNC: 11.7 G/DL (ref 11.5–16)
IMM GRANULOCYTES # BLD AUTO: 0.2 K/UL (ref 0–0.04)
IMM GRANULOCYTES NFR BLD AUTO: 1 % (ref 0–0.5)
LYMPHOCYTES # BLD: 1.1 K/UL (ref 0.8–3.5)
LYMPHOCYTES NFR BLD: 9 % (ref 12–49)
MCH RBC QN AUTO: 27.5 PG (ref 26–34)
MCHC RBC AUTO-ENTMCNC: 32.2 G/DL (ref 30–36.5)
MCV RBC AUTO: 85.2 FL (ref 80–99)
MONOCYTES # BLD: 1 K/UL (ref 0–1)
MONOCYTES NFR BLD: 8 % (ref 5–13)
NEUTS SEG # BLD: 10.2 K/UL (ref 1.8–8)
NEUTS SEG NFR BLD: 82 % (ref 32–75)
NRBC # BLD: 0 K/UL (ref 0–0.01)
NRBC BLD-RTO: 0 PER 100 WBC
PLATELET # BLD AUTO: 346 K/UL (ref 150–400)
PMV BLD AUTO: 13 FL (ref 8.9–12.9)
POTASSIUM SERPL-SCNC: 3.6 MMOL/L (ref 3.5–5.1)
PROT SERPL-MCNC: 6.7 G/DL (ref 6.4–8.2)
RBC # BLD AUTO: 4.26 M/UL (ref 3.8–5.2)
SERVICE CMNT-IMP: ABNORMAL
SODIUM SERPL-SCNC: 139 MMOL/L (ref 136–145)
WBC # BLD AUTO: 12.4 K/UL (ref 3.6–11)

## 2021-07-21 PROCEDURE — 77010033678 HC OXYGEN DAILY

## 2021-07-21 PROCEDURE — 74011250637 HC RX REV CODE- 250/637: Performed by: STUDENT IN AN ORGANIZED HEALTH CARE EDUCATION/TRAINING PROGRAM

## 2021-07-21 PROCEDURE — 65660000000 HC RM CCU STEPDOWN

## 2021-07-21 PROCEDURE — 85025 COMPLETE CBC W/AUTO DIFF WBC: CPT

## 2021-07-21 PROCEDURE — 74011250637 HC RX REV CODE- 250/637: Performed by: FAMILY MEDICINE

## 2021-07-21 PROCEDURE — 94760 N-INVAS EAR/PLS OXIMETRY 1: CPT

## 2021-07-21 PROCEDURE — 74011636637 HC RX REV CODE- 636/637: Performed by: HOSPITALIST

## 2021-07-21 PROCEDURE — 74011250636 HC RX REV CODE- 250/636: Performed by: HOSPITALIST

## 2021-07-21 PROCEDURE — 82962 GLUCOSE BLOOD TEST: CPT

## 2021-07-21 PROCEDURE — 74011250636 HC RX REV CODE- 250/636: Performed by: STUDENT IN AN ORGANIZED HEALTH CARE EDUCATION/TRAINING PROGRAM

## 2021-07-21 PROCEDURE — 99233 SBSQ HOSP IP/OBS HIGH 50: CPT | Performed by: CLINICAL NURSE SPECIALIST

## 2021-07-21 PROCEDURE — 36415 COLL VENOUS BLD VENIPUNCTURE: CPT

## 2021-07-21 PROCEDURE — 77010033711 HC HIGH FLOW OXYGEN

## 2021-07-21 PROCEDURE — 80053 COMPREHEN METABOLIC PANEL: CPT

## 2021-07-21 PROCEDURE — 74011250637 HC RX REV CODE- 250/637: Performed by: HOSPITALIST

## 2021-07-21 RX ORDER — QUETIAPINE FUMARATE 25 MG/1
25 TABLET, FILM COATED ORAL
Status: DISCONTINUED | OUTPATIENT
Start: 2021-07-21 | End: 2021-07-23 | Stop reason: HOSPADM

## 2021-07-21 RX ADMIN — INSULIN LISPRO 8 UNITS: 100 INJECTION, SOLUTION INTRAVENOUS; SUBCUTANEOUS at 13:32

## 2021-07-21 RX ADMIN — Medication 1 CAPSULE: at 09:03

## 2021-07-21 RX ADMIN — AMLODIPINE BESYLATE 10 MG: 5 TABLET ORAL at 09:03

## 2021-07-21 RX ADMIN — OXYCODONE HYDROCHLORIDE AND ACETAMINOPHEN 500 MG: 500 TABLET ORAL at 18:27

## 2021-07-21 RX ADMIN — INSULIN LISPRO 8 UNITS: 100 INJECTION, SOLUTION INTRAVENOUS; SUBCUTANEOUS at 18:28

## 2021-07-21 RX ADMIN — METOPROLOL TARTRATE 50 MG: 50 TABLET, FILM COATED ORAL at 09:03

## 2021-07-21 RX ADMIN — FUROSEMIDE 40 MG: 10 INJECTION, SOLUTION INTRAMUSCULAR; INTRAVENOUS at 09:03

## 2021-07-21 RX ADMIN — INSULIN LISPRO 8 UNITS: 100 INJECTION, SOLUTION INTRAVENOUS; SUBCUTANEOUS at 09:03

## 2021-07-21 RX ADMIN — ASPIRIN 81 MG: 81 TABLET, CHEWABLE ORAL at 09:03

## 2021-07-21 RX ADMIN — Medication 10 ML: at 07:08

## 2021-07-21 RX ADMIN — HALOPERIDOL LACTATE 1 MG: 5 INJECTION, SOLUTION INTRAMUSCULAR at 22:41

## 2021-07-21 RX ADMIN — HALOPERIDOL LACTATE 1 MG: 5 INJECTION, SOLUTION INTRAMUSCULAR at 09:02

## 2021-07-21 RX ADMIN — INSULIN LISPRO 2 UNITS: 100 INJECTION, SOLUTION INTRAVENOUS; SUBCUTANEOUS at 13:33

## 2021-07-21 RX ADMIN — Medication 10 ML: at 22:18

## 2021-07-21 RX ADMIN — OXYCODONE HYDROCHLORIDE AND ACETAMINOPHEN 500 MG: 500 TABLET ORAL at 09:03

## 2021-07-21 RX ADMIN — DEXAMETHASONE SODIUM PHOSPHATE 6 MG: 10 INJECTION, SOLUTION INTRAMUSCULAR; INTRAVENOUS at 12:29

## 2021-07-21 RX ADMIN — INSULIN LISPRO 5 UNITS: 100 INJECTION, SOLUTION INTRAVENOUS; SUBCUTANEOUS at 18:28

## 2021-07-21 NOTE — PROGRESS NOTES
[facility] Adult  Hospitalist Group                                                                                          Hospitalist Progress Note  Susanne Jaime MD  Answering service: [phone] from in house phone        Date of Service:  2021  NAME:  Nathanael Montoya  :  1950  MRN:  [id number]    This documentation was facilitated by a Voice Recognition software and may contain inadvertent typographical errors. Admission Summary:   Patient presented to the ED on  with shortness of breath and fever and she was found to have Covid pneumonia. She is not vaccinated    Interval history / Subjective:        Ms. Tod Sanchez had episode of agitation this morning again. She had IV Haldol x1. Her son and daughter present on the unit. She was a little bit calmer when I saw her, she kept saying \"I Clint Esquivel go home. .'  They told me that at times she tells them she does not know them. Asking about approximate discharge date. I explained to them it is difficult to tell, she is still requiring high oxygen, with Covid clinical course is at times unpredictable. She is also noncompliant with her oxygen use and will have to go up on her oxygen as her oxygen drops too low when she takes it off. I discussed with them use of medications to control agitation such as Haldol, lorazepam etc. and discussed with them side effects, risk and benefit and they are okay for as needed use of this type of agents. I advised them to use  the appropriate PPE,  minimize their stay and keep their distance, if they decide to go into her room that  this is a high risk exposure. The daughter said she just had Covid herself recently.   Assessment & Plan:   Acute hypoxic respiratory failure due to SARS-CoV-2 infection pneumonia  CTA no PE but findings consistent with atypical viral pneumonia  --Continue dexamethasone for 10 days or until discharge,whichever comes first.Completed Remdesivir  --Acterma x 1 dose.  --No evidence of bacterial pneumonia/infetion,d/cd  emperic Abx ,7/17  --Follow inflammatory markers. --DVT prophylaxis per Covid protocol. D-dimer 0.65 on 7/15  --On oxygen via high flow nasal cannula wean off as able to keep SPO2> 90%,patient poorly complaint with keeping the HFNC.  --Pulmonary following. --Continue IV diuretics. Uncontrolled DM with hyperglycemia precipitated by steroid and acute illness  A1C10.3  She mentions not taking any meds currently  Increase Lantus to 35 units nightly. Added Premeal Humalog 8 units AC 3 times daily. DTC help appreciated  Humalog sliding scale coverage. Elevated creatinine: Normalized  --Unknown baseline. Uncontrolled HTN:BP not on target,but fairly stable mainly in the 485-488E systolically  C/w Metoprolol, amlodipine  Hydralazine prn    Agitated delirium  --Taking off oxygen from time to time, her oxygen drops quickly when she does that. The importance of keeping the oxygen on and risks and complications hypoxia explained to patient and family. Low-dose IV Haldol as needed  --We will start low-dose Seroquel at bedtime. Access: PICC line placed on 7/15     DVT ppx: Lovenox per Covid protocol  CODE STATUS: She wishes to be PARTIAL CODE. She wants CPR but NO intubation and mechanical ventilation. Isolation: Droplet plus  Family update  --7/21: Discussed with her son and daughter at the bedside  --discussed with her daughter Mal Brar at the bedside on 7/19. Her #[phone]   --Anu Velazquez @[phone]       Morbid obesity  Estimated discharge date: Patient still requiring high flow oxygen. Hospital Problems  Never Reviewed        Codes Class Noted POA    COVID-19 ICD-10-CM: U07.1  ICD-9-CM: 079.89  7/13/2021 Unknown                Review of Systems:   A comprehensive review of systems was negative except for that written in the HPI. Vital Signs:    Last 24hrs VS reviewed since prior progress note.  Most recent are:  Visit Vitals  BP (!) 151/69 (BP 1 Location: Left upper arm, BP Patient Position: At rest)   Pulse 87   Temp 98.1 °F (36.7 °C)   Resp 26   Ht 5' 5\" (1.651 m)   Wt 71.4 kg (157 lb 8 oz)   SpO2 95%   BMI 26.21 kg/m²         Intake/Output Summary (Last 24 hours) at 7/21/2021 1729  Last data filed at 7/21/2021 1225  Gross per 24 hour   Intake    Output 2000 ml   Net -2000 ml        Physical Examination:     I had a face to face encounter with this patient and independently examined them on7/21/2021 as outlined below:        Constitutional:  Alert and oriented, bit calmer today. HEENT:  Atraumatic. Oral mucosa moist,. Non icteric sclera. No pallor. Resp:  On oxygen via high flow nasal cannula: Symmetrical air entry. Chest Wall: No deformity   CV:  Regular rhythm, normal rate, no murmurs, gallops, rubs    GI:  Soft, non distended, non tender. normoactive bowel sounds, no hepatosplenomegaly    :  No CVA or suprapubic tenderness    Musculoskeletal:  No edema, warm, 2+ pulses throughout    Neurologic:  Mental status:AAOx3,agitated. Cranial nerves II-XII : WNL  Motor exam:Moves all extremities symmetrically              Data Review:    Review and/or order of clinical lab test  Review and/or order of tests in the radiology section of CPT  Review and/or order of tests in the medicine section of CPT      Labs:     Recent Labs     07/21/21 0533 07/20/21  0024   WBC 12.4* 10.8   HGB 11.7 12.0   HCT 36.3 36.1    398     Recent Labs     07/21/21 0533 07/20/21  0024 07/19/21  0232    135* 141   K 3.6 3.7 3.6    104 107   CO2 28 23 28   BUN 32* 31* 31*   CREA 1.02 1.19* 1.21*   GLU 76 271* 208*   CA 9.1 8.5 8.7     Recent Labs     07/21/21 0533 07/20/21  0024 07/19/21  0232   ALT 45 38 32   AP 64 58 62   TBILI 0.5 0.4 0.5   TP 6.7 6.6 6.6   ALB 2.9* 2.8* 2.8*   GLOB 3.8 3.8 3.8     No results for input(s): INR, PTP, APTT, INREXT, INREXT in the last 72 hours. No results for input(s): FE, TIBC, PSAT, FERR in the last 72 hours. No results found for: FOL, RBCF   No results for input(s): PH, PCO2, PO2 in the last 72 hours. No results for input(s): CPK, CKNDX, TROIQ in the last 72 hours.     No lab exists for component: CPKMB  No results found for: CHOL, CHOLX, CHLST, CHOLV, HDL, HDLP, LDL, LDLC, DLDLP, TGLX, TRIGL, TRIGP, CHHD, CHHDX  Lab Results   Component Value Date/Time    Glucose (POC) 290 (H) 07/21/2021 04:44 PM    Glucose (POC) 277 (H) 07/21/2021 04:19 PM    Glucose (POC) 142 (H) 07/21/2021 12:19 PM    Glucose (POC) 133 (H) 07/21/2021 08:45 AM    Glucose (POC) 202 (H) 07/20/2021 10:46 PM     No results found for: COLOR, APPRN, SPGRU, REFSG, KEE, PROTU, GLUCU, KETU, BILU, UROU, GAUTAM, LEUKU, GLUKE, EPSU, BACTU, WBCU, RBCU, CASTS, UCRY      Medications Reviewed:     Current Facility-Administered Medications   Medication Dose Route Frequency    insulin lispro (HUMALOG) injection 8 Units  8 Units SubCUTAneous TID WITH MEALS    insulin glargine (LANTUS) injection 35 Units  35 Units SubCUTAneous QHS    enoxaparin (LOVENOX) injection 30 mg  30 mg SubCUTAneous Q12H    haloperidol lactate (HALDOL) injection 1 mg  1 mg IntraVENous Q6H PRN    furosemide (LASIX) injection 40 mg  40 mg IntraVENous DAILY    L.acidophilus-paracasei-S.thermophil-bifidobacter (RISAQUAD) 8 billion cell capsule  1 Capsule Oral DAILY    insulin lispro (HUMALOG)   SubCUTAneous AC&HS    glucagon (GLUCAGEN) injection 1 mg  1 mg IntraMUSCular PRN    dextrose (D50W) injection syrg 12.5 g  25 mL IntraVENous PRN    glucose chewable tablet 12 g  3 Tablet Oral PRN    amLODIPine (NORVASC) tablet 10 mg  10 mg Oral DAILY    metoprolol tartrate (LOPRESSOR) tablet 50 mg  50 mg Oral Q12H    dexamethasone (PF) (DECADRON) 10 mg/mL injection 6 mg  6 mg IntraVENous Q24H    sodium chloride (NS) flush 5-40 mL  5-40 mL IntraVENous Q8H    sodium chloride (NS) flush 5-40 mL  5-40 mL IntraVENous PRN    acetaminophen (TYLENOL) tablet 650 mg  650 mg Oral Q4H PRN    zinc sulfate (ZINCATE) 50 mg zinc (220 mg) capsule 1 Capsule  1 Capsule Oral DAILY    ascorbic acid (vitamin C) (VITAMIN C) tablet 500 mg  500 mg Oral BID    aspirin chewable tablet 81 mg  81 mg Oral DAILY    hydrALAZINE (APRESOLINE) 20 mg/mL injection 10 mg  10 mg IntraVENous Q4H PRN     ______________________________________________________________________  EXPECTED LENGTH OF STAY: 5d 9h  ACTUAL LENGTH OF STAY:          8                 Liz Alexander MD

## 2021-07-21 NOTE — PROGRESS NOTES
Bedside shift change report given to Sindhu Lynn RN (oncoming nurse) by Anali Woodward RN (offgoing nurse). Report included the following information SBAR, Kardex, ED Summary, Intake/Output, Recent Results, and Cardiac Rhythm NSR . Problem: Falls - Risk of  Goal: *Absence of Falls  Description: Document Wendy Spare Fall Risk and appropriate interventions in the flowsheet. Outcome: Progressing Towards Goal  Note: Fall Risk Interventions:  Mobility Interventions: Bed/chair exit alarm, Communicate number of staff needed for ambulation/transfer, Patient to call before getting OOB    Mentation Interventions: Bed/chair exit alarm    Medication Interventions: Patient to call before getting OOB, Evaluate medications/consider consulting pharmacy, Bed/chair exit alarm    Elimination Interventions: Call light in reach    History of Falls Interventions: Bed/chair exit alarm         Problem: Gas Exchange - Impaired  Goal: Absence of hypoxia  Outcome: Progressing Towards Goal  Goal: Promote optimal lung function  Outcome: Progressing Towards Goal     Problem: Breathing Pattern - Ineffective  Goal: Ability to achieve and maintain a regular respiratory rate  Outcome: Progressing Towards Goal     Problem:  Body Temperature -  Risk of, Imbalanced  Goal: Ability to maintain a body temperature within defined limits  Outcome: Progressing Towards Goal  Goal: Will regain or maintain usual level of consciousness  Outcome: Progressing Towards Goal     Problem: Isolation Precautions - Risk of Spread of Infection  Goal: Prevent transmission of infectious organism to others  Outcome: Progressing Towards Goal     Problem: Nutrition Deficits  Goal: Optimize nutrtional status  Outcome: Progressing Towards Goal     Problem: Risk for Fluid Volume Deficit  Goal: Maintain normal heart rhythm  Outcome: Progressing Towards Goal     Problem: Loneliness or Risk for Loneliness  Goal: Demonstrate positive use of time alone when socialization is not possible  Outcome: Progressing Towards Goal     Problem: Fatigue  Goal: Verbalize increase energy and improved vitality  Outcome: Progressing Towards Goal     Problem: Diabetes Self-Management  Goal: *Disease process and treatment process  Description: Define diabetes and identify own type of diabetes; list 3 options for treating diabetes. Outcome: Progressing Towards Goal     Problem: Pressure Injury - Risk of  Goal: *Prevention of pressure injury  Description: Document Alexys Scale and appropriate interventions in the flowsheet.   Outcome: Progressing Towards Goal  Note: Pressure Injury Interventions:  Sensory Interventions: Minimize linen layers    Moisture Interventions: Absorbent underpads    Activity Interventions: Pressure redistribution bed/mattress(bed type)    Mobility Interventions: Pressure redistribution bed/mattress (bed type)    Nutrition Interventions: Document food/fluid/supplement intake    Friction and Shear Interventions: Apply protective barrier, creams and emollients                Problem: Breathing Pattern - Ineffective  Goal: *Absence of hypoxia  Outcome: Progressing Towards Goal

## 2021-07-21 NOTE — DIABETES MGMT
[address]    CLINICAL NURSE SPECIALIST CONSULT   FOLLOW UP NOTE    Initial Presentation   Chente Monk is a 79 y.o. female who presented to the ED 7/13/21 with a 1 week c/o of shortness of breath and generalized myalgias and fatigue. She did test positive for COVID-19 at PCP office a few days prior to presentation. Upon EMS arrival, she was hypoxic with O2 sats in the 80s. HX: HTN, Pre-diabetes    DX: Acute hypoxic respiratory failure s/t COVID-19 pneumonia    Treatment plan     TX: IV antibiotics, Vit C, Zinc, decadron, remdesivir    Hospital course   Clinical progress has been complicated by steroid induced hyperglycemia. 7/13/21: Hospital admission  7/20/21: Patient hypoxic with disorientation, attempting to leave- Code [place]  7/21/21: Patient remains agitated today;   Diabetes    Patient has unknown Type 2 diabetes, untreated PTA. Family history positive for diabetes. Mother, Niece, Sister and Brother      Admission  and A1c 10.3% indicate poor diabetes control. Ambulatory blood glucose management provided by primary care provider: Sharlene Sandhu MD.    Consulted by Guillaume Vásquez MD for advanced diabetes nursing assessment and care, specifically related to   [x] Inpatient management strategy  [x] Home management assessment      Subjective   Whatever happens, happens. There is nothing you can do about it. I am not worried about it. There is not going to be a follow-up with me.     Lives independently   PCP is Dr [place] July   Has a daughter and son  Patient reports the following home diabetes self-care practices:  Eating pattern  \"I cant tell you exactly what I eat.   I eat plenty of food\"    Social determinants of health impacting diabetes self-management practices   Concerned that you need to know more about how to stay healthy with diabetes     CT with atypical pneumonia  Admitting A1C 10.3%, admitting   GFR 44-59 this admission  CRP 1.56 (peaked at 12)  Patient hypoxic with disorientation, attempting to leave- Code [place] now    Fasting B/229  Pre-prandial B-274  On decadron 6mg daily    Lantus: 25 units daily  Humalo units with meals  Correctional: 13 units in the last 24h  Objective   Physical exam  General   Neuro  Not able to speak with patient, currently hypoxic in a Code [place]  Vital Signs   Visit Vitals  /68   Pulse 80   Temp 97.5 °F (36.4 °C)   Resp 23   Ht 5' 5\" (1.651 m)   Wt 71.4 kg (157 lb 8 oz)   SpO2 94%   BMI 26.21 kg/m²     Deferred PE due to active COVID-19 infection    Laboratory      CBC WITH AUTOMATED DIFF    Collection Time: 21  5:33 AM   Result Value Ref Range    WBC 12.4 (H) 3.6 - 11.0 K/uL    RBC 4.26 3.80 - 5.20 M/uL    HGB 11.7 11.5 - 16.0 g/dL    HCT 36.3 35.0 - 47.0 %    MCV 85.2 80.0 - 99.0 FL    MCH 27.5 26.0 - 34.0 PG    MCHC 32.2 30.0 - 36.5 g/dL    RDW 14.3 11.5 - 14.5 %    PLATELET 246 564 - 138 K/uL    MPV 13.0 (H) 8.9 - 12.9 FL    NRBC 0.0 0  WBC    ABSOLUTE NRBC 0.00 0.00 - 0.01 K/uL    NEUTROPHILS 82 (H) 32 - 75 %    LYMPHOCYTES 9 (L) 12 - 49 %    MONOCYTES 8 5 - 13 %    EOSINOPHILS 0 0 - 7 %    BASOPHILS 0 0 - 1 %    IMMATURE GRANULOCYTES 1 (H) 0.0 - 0.5 %    ABS. NEUTROPHILS 10.2 (H) 1.8 - 8.0 K/UL    ABS. LYMPHOCYTES 1.1 0.8 - 3.5 K/UL    ABS. MONOCYTES 1.0 0.0 - 1.0 K/UL    ABS. EOSINOPHILS 0.0 0.0 - 0.4 K/UL    ABS. BASOPHILS 0.0 0.0 - 0.1 K/UL    ABS. IMM.  GRANS. 0.2 (H) 0.00 - 0.04 K/UL    DF AUTOMATED           METABOLIC PANEL, COMPREHENSIVE    Collection Time: 21  5:33 AM   Result Value Ref Range    Sodium 139 136 - 145 mmol/L    Potassium 3.6 3.5 - 5.1 mmol/L    Chloride 103 97 - 108 mmol/L    CO2 28 21 - 32 mmol/L    Anion gap 8 5 - 15 mmol/L    Glucose 76 65 - 100 mg/dL    BUN 32 (H) 6 - 20 MG/DL    Creatinine 1.02 0.55 - 1.02 MG/DL    BUN/Creatinine ratio 31 (H) 12 - 20      GFR est AA >60 >60 ml/min/1.73m2    GFR est non-AA 54 (L) >60 ml/min/1.73m2    Calcium 9.1 8.5 - 10.1 MG/DL    Bilirubin, total 0.5 0.2 - 1.0 MG/DL    ALT (SGPT) 45 12 - 78 U/L    AST (SGOT) 45 (H) 15 - 37 U/L    Alk. phosphatase 64 45 - 117 U/L    Protein, total 6.7 6.4 - 8.2 g/dL    Albumin 2.9 (L) 3.5 - 5.0 g/dL    Globulin 3.8 2.0 - 4.0 g/dL    A-G Ratio 0.8 (L) 1.1 - 2.2         Factors impacting BG management  Factor Dose Comments   Nutrition:  Carb-controlled meals     60 grams/meal      Drugs:  Steroids     Decadron 6mg daily      Blood glucose pattern        Assessment and Plan   Nursing Diagnosis Risk for unstable blood glucose pattern   Nursing Intervention Domain 5254 Decision-making Support   Nursing Interventions Examined current inpatient diabetes control   Explored factors facilitating and impeding inpatient management  Identified self-management practices impeding diabetes control  Explored corrective strategies with patient and responsible inpatient provider   Informed patient of rational for insulin strategy while hospitalized  Instructed patient in:    Diabetes Education Checklist    Pathophysiology  [x] Diabetes basics  [x] Differences between type 1 and type 2    Diagnostic Criteria  [x] Interpretation of Labs  [x] Hemoglobin A1c  [x] Blood glucose goals  Notes: Goal A1C 7%, patients A1C 10.3%  Goal glucose under 200    Blood Glucose Monitoring  [x] Using a glucometer  [x] When to check BG  [x] Record keeping  Notes: Discussed they will need to obtain a meter, lancets and strips. Patient instructed to obtain a glucose reading before meals and bedtime and if the \"don't feel right\"  Patient to create a log of blood sugar readings and present to their PCP for long term management. Reducing Risks  [x] Long-term complications of diabetes     Evaluation   Wesley Arriaga is a 79year old female who was admitted with acute hypoxic respiratory failure s/t COVID-19 pneumonia. Glucose on admission elevated at 324 and A1C 10.3% indicating a new diagnosis of Type 2 Diabetes.   During this hospitalization, she was started on IV antibiotics, IV decadron, and remdesivir. Moderate lantus and humalog were stared this admission, but the patient has not achieved inpatient blood glucose target of 100-180mg/dl. Several factors have played a role in blood glucose management including:  [x] Critical nature of illness state  [x]  Changing nutritive sources & needs   [x] Glucocorticoid use    The Subcutaneous Insulin Order set (0011) has been in use but not sufficient to to override steroid impact. Hence, the next step in optimizing blood glucose control would be    [x]  Optimize basal and bolus insulin dosing     Unfortunately, patient is a very poor historian and has poor judgement. At this time, she has been counseled in diagnosis of diabetes but she is very resistant to acknowledge diagnosis and/or treatment options. She still remains confused today and will continue to address with her as her mental status improves. FBG 133mg/dl  On appropriate basal dose  On daily Decadron  Recommendations   1. POC glucose ACHS  2. Consistent carbohydrate diet (60 grams CHO/meal)  3. Adjust the Subcutaneous Insulin Order set (5645)  Insulin Dosing Specific recommendation   CONTINUE Basal                                      (Based on weight, BMI & GFR) 35 units Lantus once daily IF FBG <100, reduce basal dose by 20%. CONTINUE Nutritional                        (Based on CHO/dextrose load) 8 units Humalog/meal    CONTINUE Corrective                           (Useful in adjusting insulin dosing) [x] Insulin-resistant sensitivity        Discharge Planning   1. Will need a FUV with PCP within 1-2 weeks after hospital discharge for ongoing diabetes management     2. Start Metformin 1000mg daily    3. Start Amaryl 2mg once daily    4. Prescription for glucometer kit (Meter, Lancets (100), Strips (100)). Patient to obtain a blood glucose reading four times daily.   First thing in the morning prior to eating and drinking anything then before lunch, dinner and bedtime. Create a log and present to PCP for interpretation. Patient very resistant to taking medication for treating diabetes. Billing Code(s)   [x] 19573     Before making these care recommendations, I personally reviewed the hosptialization record, including laboratory and diagnostic data, medications and examined the patient at bedside (circumstances permitting).   Total minutes: [address] RANDY Santana  Diabetes Clinical Nurse Specialist  [facility]  Access via [address] [place] [address]

## 2021-07-21 NOTE — PROGRESS NOTES
Oxygen saturation 88%, this RN encouraged patient to sit up, pt refused. Pt is extremely agitated, stating, \"You're not doing anything to me! You've said I've had breathing problems! I didn't have any of this until I came in here! Don't touch me, get out of my face! \" Son present in room. S/w Dr. Angeline Pavon to round shortly.

## 2021-07-22 LAB
GLUCOSE BLD STRIP.AUTO-MCNC: 106 MG/DL (ref 65–117)
GLUCOSE BLD STRIP.AUTO-MCNC: 175 MG/DL (ref 65–117)
GLUCOSE BLD STRIP.AUTO-MCNC: 293 MG/DL (ref 65–117)
GLUCOSE BLD STRIP.AUTO-MCNC: 319 MG/DL (ref 65–117)
SERVICE CMNT-IMP: ABNORMAL
SERVICE CMNT-IMP: NORMAL

## 2021-07-22 PROCEDURE — 74011636637 HC RX REV CODE- 636/637: Performed by: HOSPITALIST

## 2021-07-22 PROCEDURE — 65660000000 HC RM CCU STEPDOWN

## 2021-07-22 PROCEDURE — 74011250637 HC RX REV CODE- 250/637: Performed by: HOSPITALIST

## 2021-07-22 PROCEDURE — 74011250636 HC RX REV CODE- 250/636: Performed by: HOSPITALIST

## 2021-07-22 PROCEDURE — 99222 1ST HOSP IP/OBS MODERATE 55: CPT | Performed by: INTERNAL MEDICINE

## 2021-07-22 PROCEDURE — 74011250636 HC RX REV CODE- 250/636: Performed by: NURSE PRACTITIONER

## 2021-07-22 PROCEDURE — 99233 SBSQ HOSP IP/OBS HIGH 50: CPT | Performed by: CLINICAL NURSE SPECIALIST

## 2021-07-22 PROCEDURE — 74011250637 HC RX REV CODE- 250/637: Performed by: FAMILY MEDICINE

## 2021-07-22 PROCEDURE — 82962 GLUCOSE BLOOD TEST: CPT

## 2021-07-22 PROCEDURE — 74011250637 HC RX REV CODE- 250/637: Performed by: STUDENT IN AN ORGANIZED HEALTH CARE EDUCATION/TRAINING PROGRAM

## 2021-07-22 RX ORDER — HALOPERIDOL 5 MG/ML
2.5 INJECTION INTRAMUSCULAR ONCE
Status: COMPLETED | OUTPATIENT
Start: 2021-07-22 | End: 2021-07-22

## 2021-07-22 RX ADMIN — INSULIN LISPRO 7 UNITS: 100 INJECTION, SOLUTION INTRAVENOUS; SUBCUTANEOUS at 18:09

## 2021-07-22 RX ADMIN — METOPROLOL TARTRATE 50 MG: 50 TABLET, FILM COATED ORAL at 23:07

## 2021-07-22 RX ADMIN — HALOPERIDOL LACTATE 2.5 MG: 5 INJECTION, SOLUTION INTRAMUSCULAR at 04:45

## 2021-07-22 RX ADMIN — HALOPERIDOL LACTATE 1 MG: 5 INJECTION, SOLUTION INTRAMUSCULAR at 10:53

## 2021-07-22 RX ADMIN — ACETAMINOPHEN 650 MG: 325 TABLET ORAL at 18:09

## 2021-07-22 RX ADMIN — INSULIN GLARGINE 35 UNITS: 100 INJECTION, SOLUTION SUBCUTANEOUS at 23:06

## 2021-07-22 RX ADMIN — INSULIN LISPRO 5 UNITS: 100 INJECTION, SOLUTION INTRAVENOUS; SUBCUTANEOUS at 14:02

## 2021-07-22 RX ADMIN — Medication 10 ML: at 23:09

## 2021-07-22 RX ADMIN — QUETIAPINE FUMARATE 25 MG: 25 TABLET ORAL at 23:07

## 2021-07-22 RX ADMIN — ENOXAPARIN SODIUM 30 MG: 30 INJECTION SUBCUTANEOUS at 18:09

## 2021-07-22 RX ADMIN — INSULIN LISPRO 8 UNITS: 100 INJECTION, SOLUTION INTRAVENOUS; SUBCUTANEOUS at 18:09

## 2021-07-22 RX ADMIN — HALOPERIDOL LACTATE 1 MG: 5 INJECTION, SOLUTION INTRAMUSCULAR at 23:07

## 2021-07-22 RX ADMIN — DEXAMETHASONE SODIUM PHOSPHATE 6 MG: 10 INJECTION, SOLUTION INTRAMUSCULAR; INTRAVENOUS at 10:09

## 2021-07-22 RX ADMIN — INSULIN LISPRO 8 UNITS: 100 INJECTION, SOLUTION INTRAVENOUS; SUBCUTANEOUS at 14:02

## 2021-07-22 RX ADMIN — OXYCODONE HYDROCHLORIDE AND ACETAMINOPHEN 500 MG: 500 TABLET ORAL at 18:09

## 2021-07-22 NOTE — DIABETES MGMT
[facility]    CLINICAL NURSE SPECIALIST CONSULT   FOLLOW UP NOTE    Initial Presentation   Irma Bradshaw is a 79 y.o. female who presented to the ED 7/13/21 with a 1 week c/o of shortness of breath and generalized myalgias and fatigue. She did test positive for COVID-19 at PCP office a few days prior to presentation. Upon EMS arrival, she was hypoxic with O2 sats in the 80s. HX: HTN, Pre-diabetes    DX: Acute hypoxic respiratory failure s/t COVID-19 pneumonia    Treatment plan     TX: IV antibiotics, Vit C, Zinc, decadron, remdesivir    Hospital course   Clinical progress has been complicated by steroid induced hyperglycemia. 7/13/21: Hospital admission  7/20/21: Patient hypoxic with disorientation, attempting to leave- Code [place]  7/21/21: Patient remains agitated today. Rapid response for hypoxia with refusal to weak O2  Diabetes    Patient has unknown Type 2 diabetes, untreated PTA. Family history positive for diabetes. Mother, Niece, Sister and Brother      Admission  and A1c 10.3% indicate poor diabetes control. Ambulatory blood glucose management provided by primary care provider: Beltran Farooq MD.    Consulted by Haley Lewis MD for advanced diabetes nursing assessment and care, specifically related to   [x] Inpatient management strategy  [x] Home management assessment      Subjective   Whatever happens, happens. There is nothing you can do about it. I am not worried about it. There is not going to be a follow-up with me.     Lives independently   PCP is Dr Blue Six   Has a daughter and son  Patient reports the following home diabetes self-care practices:  Eating pattern  \"I cant tell you exactly what I eat.   I eat plenty of food\"    Social determinants of health impacting diabetes self-management practices   Concerned that you need to know more about how to stay healthy with diabetes     CT with atypical pneumonia  Admitting A1C 10.3%, admitting BG 342  GFR 44-59 this admission  CRP 1.56 (peaked at 12)  Patient hypoxic with disorientation, attempting to leave- Code [place] now    Fasting B/209/229  Pre-prandial B-290  On decadron 6mg daily    Lantus: 35 units daily- refused dose last night  Humalo units with meals  Correctional: 13 units in the last 24h  Objective   Physical exam  General   Neuro  Not able to speak with patient, currently agitated and refusing to talk  Vital Signs   Visit Vitals  BP (!) 157/80   Pulse 89   Temp 98.2 °F (36.8 °C)   Resp 20   Ht 5' 5\" (1.651 m)   Wt 84.8 kg (187 lb)   SpO2 99%   BMI 31.12 kg/m²     Deferred PE due to active COVID-19 infection    Laboratory  No results found for this visit on 21 (from the past 12 hour(s)). No results found for this visit on 21 (from the past 12 hour(s)).     Factors impacting BG management  Factor Dose Comments   Nutrition:  Carb-controlled meals     60 grams/meal      Drugs:  Steroids     Decadron 6mg daily      Blood glucose pattern        Assessment and Plan   Nursing Diagnosis Risk for unstable blood glucose pattern   Nursing Intervention Domain 5250 Decision-making Support   Nursing Interventions Examined current inpatient diabetes control   Explored factors facilitating and impeding inpatient management  Identified self-management practices impeding diabetes control  Explored corrective strategies with patient and responsible inpatient provider   Informed patient of rational for insulin strategy while hospitalized  Instructed patient in:    Diabetes Education Checklist    Pathophysiology  [x] Diabetes basics  [x] Differences between type 1 and type 2    Diagnostic Criteria  [x] Interpretation of Labs  [x] Hemoglobin A1c  [x] Blood glucose goals  Notes: Goal A1C 7%, patients A1C 10.3%  Goal glucose under 200    Blood Glucose Monitoring  [x] Using a glucometer  [x] When to check BG  [x] Record keeping  Notes: Discussed they will need to obtain a meter, lancets and strips. Patient instructed to obtain a glucose reading before meals and bedtime and if the \"don't feel right\"  Patient to create a log of blood sugar readings and present to their PCP for long term management. Reducing Risks  [x] Long-term complications of diabetes     Evaluation   Guilherme Connell is a 79year old female who was admitted with acute hypoxic respiratory failure s/t COVID-19 pneumonia. Glucose on admission elevated at 324 and A1C 10.3% indicating a new diagnosis of Type 2 Diabetes. During this hospitalization, she was started on IV antibiotics, IV decadron, and remdesivir. Moderate lantus and humalog were stared this admission, but the patient has not achieved inpatient blood glucose target of 100-180mg/dl. Several factors have played a role in blood glucose management including:  [x] Critical nature of illness state  [x]  Changing nutritive sources & needs   [x] Glucocorticoid use    The Subcutaneous Insulin Order set (4925) has been in use but she has refused several injections in the last 2 days, contributing to hyperglycemia. Unfortunately, patient is a very poor historian and has poor judgement. At this time, she has been counseled in diagnosis of diabetes but she is very resistant to acknowledge diagnosis and/or treatment options. She still remains confused today and will continue to address with her as her mental status improves. Recommendations   1. POC glucose ACHS  2. Consistent carbohydrate diet (60 grams CHO/meal)  3. Continue the Subcutaneous Insulin Order set (4020)  Insulin Dosing Specific recommendation   CONTINUE Basal                            (Based on weight, BMI & GFR) 35 units Lantus once daily IF FBG <100, reduce basal dose by 20%. CONTINUE Nutritional                      (Based on CHO/dextrose load) 8 units Humalog/meal    CONTINUE Corrective                     (Useful in adjusting insulin dosing) [x] Insulin-resistant sensitivity        Discharge Planning   1. Will need a FUV with PCP within 1-2 weeks after hospital discharge for ongoing diabetes management     2. Start Metformin 1000mg daily    3. Start Amaryl 2mg once daily    4. Prescription for glucometer kit (Meter, Lancets (100), Strips (100)). Patient to obtain a blood glucose reading four times daily. First thing in the morning prior to eating and drinking anything then before lunch, dinner and bedtime. Create a log and present to PCP for interpretation. Patient very resistant to taking medication for treating diabetes. Billing Code(s)   [x] 90113     Before making these care recommendations, I personally reviewed the hosptialization record, including laboratory and diagnostic data, medications and examined the patient at bedside (circumstances permitting).   Total minutes: 28    RANDY Hsu  Diabetes Clinical Nurse Specialist  [facility]  Access via [employer]

## 2021-07-22 NOTE — PROGRESS NOTES
Bedside shift change report given to [address] [place] [address] (oncoming nurse) by Natacha Barone RN (offgoing nurse). Report included the following information SBAR, Kardex, Intake/Output, MAR and Recent Results. Problem: Falls - Risk of  Goal: *Absence of Falls  Description: Document [place] Pillow Fall Risk and appropriate interventions in the flowsheet. Outcome: Progressing Towards Goal  Note: Fall Risk Interventions:  Mobility Interventions: Patient to call before getting OOB    Mentation Interventions: Bed/chair exit alarm    Medication Interventions: Bed/chair exit alarm    Elimination Interventions: Call light in reach    History of Falls Interventions: Bed/chair exit alarm         Problem: Gas Exchange - Impaired  Goal: Absence of hypoxia  Outcome: Progressing Towards Goal  Goal: Promote optimal lung function  Outcome: Progressing Towards Goal     Problem: Breathing Pattern - Ineffective  Goal: Ability to achieve and maintain a regular respiratory rate  Outcome: Progressing Towards Goal     Problem: Diabetes Self-Management  Goal: *Disease process and treatment process  Description: Define diabetes and identify own type of diabetes; list 3 options for treating diabetes. Outcome: Progressing Towards Goal  Goal: *Incorporating nutritional management into lifestyle  Description: Describe effect of type, amount and timing of food on blood glucose; list 3 methods for planning meals. Outcome: Progressing Towards Goal  Goal: *Using medications safely  Description: State effect of diabetes medications on diabetes; name diabetes medication taking, action and side effects. Outcome: Progressing Towards Goal  Goal: *Monitoring blood glucose, interpreting and using results  Description: Identify recommended blood glucose targets  and personal targets.   Outcome: Progressing Towards Goal     Problem: Pressure Injury - Risk of  Goal: *Prevention of pressure injury  Description: Document Alexys Scale and appropriate interventions in the flowsheet.   Outcome: Progressing Towards Goal  Note: Pressure Injury Interventions:  Sensory Interventions: Assess changes in LOC, Assess need for specialty bed    Moisture Interventions: Check for incontinence Q2 hours and as needed    Activity Interventions: Pressure redistribution bed/mattress(bed type)    Mobility Interventions: HOB 30 degrees or less    Nutrition Interventions: Document food/fluid/supplement intake    Friction and Shear Interventions: Minimize layers                Problem: Breathing Pattern - Ineffective  Goal: *Absence of hypoxia  Outcome: Progressing Towards Goal

## 2021-07-22 NOTE — PROGRESS NOTES
21:57 RT went in room to put patient on High flow nasal canula, patient is refusing to wear any oxygen or pulse oz. Patient said she is tired and wants to go home. RT explained to the patient the benefits and risk associated with wearing or not wearing her oxygen.

## 2021-07-22 NOTE — PROGRESS NOTES
Pt refusing all morning medications. MD to be informed. 1120: Pt satting in the 76s, taking off high flow oxygen, leads, nightgown, and stating, \"I'm going to leave here\". Rapid response called. Haldol given IV. Pt amenable to putting on nonrebreather, taking PO meds; pt sitting in chair with call bell in reach, bed alarm present. 1600: Shift change report given to Mundo Robbins RN.

## 2021-07-22 NOTE — PROGRESS NOTES
Hospitalist Night Cover     Name: Irma Bradshaw  YOB: 1950      Overnight update:        Rapid response called 84 17 85. Patient refusing to wear oxygen, aggressive - swinging oxygen tubing at staff. Similar to prior episode noted on attending progress note.  Due for PRN haldol  - Will give increased dose Haldol now  - Family has been contacted and en route to hospital to stay with patient  - During episode she is not wearing oxygen, but does not appear dyspneic     Jonas MENJIVAR-C, PA-C  [phone] or [place]

## 2021-07-22 NOTE — PROGRESS NOTES
[facility]  Hospitalist Group                                                                                          Hospitalist Progress Note  Cody Oviedo MD  Answering service: [phone] from in house phone        Date of Service:  2021  NAME:  Bipin Renteria  :  1950  MRN:  [id number]    This documentation was facilitated by a Voice Recognition software and may contain inadvertent typographical errors. Admission Summary:   Patient presented to the ED on  with shortness of breath and fever and she was found to have Covid pneumonia. She is not vaccinated    Interval history / Subjective:        Patient got agitated anxious again this morning ripped off all the connections  Called patient son and updated about her condition  Later patient come down and agreed to take all her meds this has been going on for a while  And also time to time refuses all her medications on Haldol and Seroquel    Assessment & Plan:   Acute hypoxic respiratory failure due to SARS-CoV-2 infection pneumonia  CTA no PE but findings consistent with atypical viral pneumonia  --Continue dexamethasone for 10 days or until discharge,whichever comes first.Completed Remdesivir  --Acterma x 1 dose. --No evidence of bacterial pneumonia/infetion,d/cd  emperic Abx ,  --Follow inflammatory markers. --DVT prophylaxis per Covid protocol. D-dimer 0.65 on 7/15  --On oxygen via high flow nasal cannula wean off as able to keep SPO2> 90%,patient poorly complaint with keeping the HFNC.  --Pulmonary following. --Continue IV diuretics. Uncontrolled DM with hyperglycemia precipitated by steroid and acute illness  A1C10.3  She mentions not taking any meds currently  Increase Lantus to 35 units nightly. Added Premeal Humalog 8 units AC 3 times daily.   DTC help appreciated  Humalog sliding scale coverage.   -Discharged on oral medications as instructed Metformin and Amaryl     Elevated creatinine: Normalized  --Unknown baseline. Uncontrolled HTN:BP not on target,but fairly stable mainly in the 612-183J systolically  C/w Metoprolol, amlodipine  Hydralazine prn    Agitated delirium  --Taking off oxygen from time to time, her oxygen drops quickly when she does that. The importance of keeping the oxygen on and risks and complications hypoxia explained to patient and family. Low-dose IV Haldol as needed  --We will start low-dose Seroquel at bedtime. Access: PICC line placed on 7/15     DVT ppx: Lovenox per Covid protocol  CODE STATUS: She wishes to be PARTIAL CODE. She wants CPR but NO intubation and mechanical ventilation. Isolation: Droplet plus  Family update  Called son Skyler Rea today and updated Calvin Cortez @[phone]       Morbid obesity  Estimated discharge date: Patient still requiring high flow oxygen. Hospital Problems  Never Reviewed        Codes Class Noted POA    COVID-19 ICD-10-CM: U07.1  ICD-9-CM: 079.89  7/13/2021 Unknown                Review of Systems:   A comprehensive review of systems was negative except for that written in the HPI. Vital Signs:    Last 24hrs VS reviewed since prior progress note. Most recent are:  Visit Vitals  BP (!) 157/80   Pulse 89   Temp 98.2 °F (36.8 °C)   Resp 20   Ht 5' 5\" (1.651 m)   Wt 84.8 kg (187 lb)   SpO2 100%   BMI 31.12 kg/m²       No intake or output data in the 24 hours ending 07/22/21 1324     Physical Examination:     I had a face to face encounter with this patient and independently examined them on7/22/2021 as outlined below:        Constitutional:  Alert and oriented, bit calmer today. HEENT:  Atraumatic. Oral mucosa moist,. Non icteric sclera. No pallor. Resp:  On oxygen via high flow nasal cannula: Symmetrical air entry. Chest Wall: No deformity   CV:  Regular rhythm, normal rate, no murmurs, gallops, rubs    GI:  Soft, non distended, non tender.  normoactive bowel sounds, no hepatosplenomegaly    :  No CVA or suprapubic tenderness    Musculoskeletal:  No edema, warm, 2+ pulses throughout    Neurologic:  Mental status:AAOx3,agitated. Cranial nerves II-XII : WNL  Motor exam:Moves all extremities symmetrically              Data Review:    Review and/or order of clinical lab test  Review and/or order of tests in the radiology section of CPT  Review and/or order of tests in the medicine section of [facility]      Labs:     Recent Labs     07/21/21  0533 07/20/21  0024   WBC 12.4* 10.8   HGB 11.7 12.0   HCT 36.3 36.1    398     Recent Labs     07/21/21  0533 07/20/21  0024    135*   K 3.6 3.7    104   CO2 28 23   BUN 32* 31*   CREA 1.02 1.19*   GLU 76 271*   CA 9.1 8.5     Recent Labs     07/21/21 0533 07/20/21  0024   ALT 45 38   AP 64 58   TBILI 0.5 0.4   TP 6.7 6.6   ALB 2.9* 2.8*   GLOB 3.8 3.8     No results for input(s): INR, PTP, APTT, INREXT, INREXT in the last 72 hours. No results for input(s): FE, TIBC, PSAT, FERR in the last 72 hours. No results found for: FOL, RBCF   No results for input(s): PH, PCO2, PO2 in the last 72 hours. No results for input(s): CPK, CKNDX, TROIQ in the last 72 hours.     No lab exists for component: CPKMB  No results found for: CHOL, CHOLX, CHLST, CHOLV, HDL, HDLP, LDL, LDLC, DLDLP, TGLX, TRIGL, TRIGP, CHHD, CHHDX  Lab Results   Component Value Date/Time    Glucose (POC) 293 (H) 07/22/2021 12:09 PM    Glucose (POC) 175 (H) 07/22/2021 09:09 AM    Glucose (POC) 274 (H) 07/21/2021 10:44 PM    Glucose (POC) 290 (H) 07/21/2021 04:44 PM    Glucose (POC) 277 (H) 07/21/2021 04:19 PM     No results found for: COLOR, APPRN, SPGRU, REFSG, KEE, PROTU, GLUCU, KETU, BILU, UROU, GAUTAM, LEUKU, GLUKE, EPSU, BACTU, WBCU, RBCU, CASTS, UCRY      Medications Reviewed:     Current Facility-Administered Medications   Medication Dose Route Frequency    QUEtiapine (SEROquel) tablet 25 mg  25 mg Oral QHS    insulin lispro (HUMALOG) injection 8 Units  8 Units SubCUTAneous TID WITH MEALS    insulin glargine (LANTUS) injection 35 Units  35 Units SubCUTAneous QHS    enoxaparin (LOVENOX) injection 30 mg  30 mg SubCUTAneous Q12H    haloperidol lactate (HALDOL) injection 1 mg  1 mg IntraVENous Q6H PRN    furosemide (LASIX) injection 40 mg  40 mg IntraVENous DAILY    L.acidophilus-paracasei-S.thermophil-bifidobacter (RISAQUAD) 8 billion cell capsule  1 Capsule Oral DAILY    insulin lispro (HUMALOG)   SubCUTAneous AC&HS    glucagon (GLUCAGEN) injection 1 mg  1 mg IntraMUSCular PRN    dextrose (D50W) injection syrg 12.5 g  25 mL IntraVENous PRN    glucose chewable tablet 12 g  3 Tablet Oral PRN    amLODIPine (NORVASC) tablet 10 mg  10 mg Oral DAILY    metoprolol tartrate (LOPRESSOR) tablet 50 mg  50 mg Oral Q12H    sodium chloride (NS) flush 5-40 mL  5-40 mL IntraVENous Q8H    sodium chloride (NS) flush 5-40 mL  5-40 mL IntraVENous PRN    acetaminophen (TYLENOL) tablet 650 mg  650 mg Oral Q4H PRN    zinc sulfate (ZINCATE) 50 mg zinc (220 mg) capsule 1 Capsule  1 Capsule Oral DAILY    ascorbic acid (vitamin C) (VITAMIN C) tablet 500 mg  500 mg Oral BID    aspirin chewable tablet 81 mg  81 mg Oral DAILY    hydrALAZINE (APRESOLINE) 20 mg/mL injection 10 mg  10 mg IntraVENous Q4H PRN     ______________________________________________________________________  EXPECTED LENGTH OF STAY: 5d 9h  ACTUAL LENGTH OF STAY:          9                 Katheen Lanes, MD

## 2021-07-22 NOTE — CONSULTS
Palliative Medicine Consult  [place]: [phone] ([phone]    Patient Name: Chente Monk  YOB: 1950    Date of Initial Consult: 7/22/2021  Reason for Consult: care decisions  Requesting Provider: Dr. Issa Meyer  Primary Care Physician: Nuvia Marina MD     SUMMARY:   Chente Monk is a 79 y.o. with a past history of morbid obesity (BMI 31), type 2 DM, HTN, recent COVID+ at outpatient, who was admitted on 7/13/2021 from home with a diagnosis of fever, SOB, fatigue for one week. Current medical issues leading to Palliative Medicine involvement include: COVID19  Pneumonia (d/p remdesivir) currently on 20L high flow oxygen, hospital course complicated by hyperglycemia from steroids, and periods of agitation mostly when hypoxic/ removing oxygen, requiring PRN haldol. CTA: negative for PE, +atypical viral pneumonia  (abx d/pranay 7/17, procalcitonin <0.05 (7/15)    Patient lives alone in [address] prior to admission. Independent in ADLs prior to admission  Dtr Jerry Moore lives in [address] [facility] and son Barbra Lopez lives in [place] ( [phone])  . PALLIATIVE DIAGNOSES:   1. Acute respiratory failure due to COVID 19 pneumonia. , improving slowly with supportive tx. 2. Agitation, delirium, due to periodic hypoxia (removing oxygen)  3. Debility due to prolonged hospital stay [facility] HOSPITAL day #9)  4. Anemia  5. Appetite good (ate 100% of lunch)        PLAN:   1. Discussed with bedside nurse. 2. Palliative Medicine services introduced to patient. 1. She acknowledges being motivated to get better, survive this. 2. Wants to go home ASAP, agrees to staying as long as needed to get well. We talk about her high oxygen needs, that if she left right now against our advice she would die at home without so much oxygen as she gets here. 3. Goals already clarified for Do Not intubate (did not readdress this today)  4. Offered puzzles, magazines, books, markers. .. she is not interested in any of that.   5. Frustrated there is nothing to watch on the TV. Bed is uncomfortable compared to where she sleeps at home. 3. Continue to encourage increased activity as tolerated. 4. Will check back peripherally. (anticipate improvement, call us if she has ongoing decline )   1. Hopeful she can continue to make progress towards recovery. 5. Goals clear to continue supportive measures, avoid intubation if things worsen, get home as soon as possible. 6. Agitation  1. Continue PRN haldol  2. Agree with night time seroquel if she will take it PO  3. Keep shades open during the day, increase daytime activity as tolerated  7. Initial consult note routed to primary continuity provider and/or primary health care team members  8. Communicated plan of care with: Palliative IDTHoracio 192 Team     GOALS OF CARE / TREATMENT PREFERENCES:     GOALS OF CARE:  Patient/Health Care Proxy Stated Goals: Rehabilitation    TREATMENT PREFERENCES:   Code Status: Partial Code    Advance Care Planning:  [] The [facility] Interdisciplinary Team has updated the ACP Navigator with [address] and Patient Capacity      No flowsheet data found. Medical Interventions: Limited additional interventions     Other Instructions: Other:    As far as possible, the palliative care team has discussed with patient / health care proxy about goals of care / treatment preferences for patient. HISTORY:     History obtained from: chart    CHIEF COMPLAINT: fever, SOB    HPI/SUBJECTIVE:    The patient is:   [x] Verbal and participatory  [] Non-participatory due to:     79year old female with history of COVID+ admitted with pneumonia, fever, hypoxia    One week hx of fatigue and dyspnea.   Recent COVID+ test as outpatient    Clinical Pain Assessment (nonverbal scale for severity on nonverbal patients):   Clinical Pain Assessment  Severity: 0          Duration: for how long has pt been experiencing pain (e.g., 2 days, 1 month, years)  Frequency: how often pain is an issue (e.g., several times per day, once every few days, constant)     FUNCTIONAL ASSESSMENT:     Palliative Performance Scale (PPS):  PPS: 40       PSYCHOSOCIAL/SPIRITUAL SCREENING:     Palliative IDT has assessed this patient for cultural preferences / practices and a referral made as appropriate to needs (Cultural Services, Patient Advocacy, Ethics, etc.)    Any spiritual / Congregation concerns:  [] Yes /  [x] No    Caregiver Burnout:  [] Yes /  [x] No /  [] No Caregiver Present      Anticipatory grief assessment:   [x] Normal  / [] Maladaptive       ESAS Anxiety: Anxiety: 0    ESAS Depression: Depression: 0        REVIEW OF SYSTEMS:     Positive and pertinent negative findings in ROS are noted above in HPI. The following systems were [x] reviewed / [] unable to be reviewed as noted in HPI  Other findings are noted below. Systems: constitutional, ears/nose/mouth/throat, respiratory, gastrointestinal, genitourinary, musculoskeletal, integumentary, neurologic, psychiatric, endocrine. Positive findings noted below. Modified ESAS Completed by: provider   Fatigue: 3 Drowsiness: 0   Depression: 0 Pain: 0   Anxiety: 0 Nausea: 0   Anorexia: 0 Dyspnea: 3           Stool Occurrence(s): 1        PHYSICAL EXAM:     From RN flowsheet:  Wt Readings from Last 3 Encounters:   07/22/21 84.8 kg (187 lb)     Blood pressure (!) 157/80, pulse 89, temperature 98.2 °F (36.8 °C), resp. rate 20, height 5' 5\" (1.651 m), weight 84.8 kg (187 lb), SpO2 100 %.     Pain Scale 1: Numeric (0 - 10)  Pain Intensity 1: 0     Pain Location 1: Abdomen  Pain Orientation 1: Anterior, Left, Right  Pain Description 1: Aching  Pain Intervention(s) 1: Medication (see MAR)  Last bowel movement, if known:     Constitutional: awake, alert, sitting up in bed in front of lunch tray (100% eaten) NAD  Hi flow cannula in place  Pleasant, conversant  Chest clear anteriorly , no wheezing  Card reg s1s2  abd soft NT  Speech fluent  Insight ; not well demonstrated. Pt easily frustrated talking about her hospital stay, desire to go home  Redirectable. (glad to have some ice cream)       HISTORY:     Active Problems:    COVID-19 (7/13/2021)      No past medical history on file. No past surgical history on file. No family history on file. History reviewed, no pertinent family history.   Social History     Tobacco Use    Smoking status: Not on file   Substance Use Topics    Alcohol use: Not on file     No Known Allergies   Current Facility-Administered Medications   Medication Dose Route Frequency    QUEtiapine (SEROquel) tablet 25 mg  25 mg Oral QHS    insulin lispro (HUMALOG) injection 8 Units  8 Units SubCUTAneous TID WITH MEALS    insulin glargine (LANTUS) injection 35 Units  35 Units SubCUTAneous QHS    enoxaparin (LOVENOX) injection 30 mg  30 mg SubCUTAneous Q12H    haloperidol lactate (HALDOL) injection 1 mg  1 mg IntraVENous Q6H PRN    furosemide (LASIX) injection 40 mg  40 mg IntraVENous DAILY    L.acidophilus-paracasei-S.thermophil-bifidobacter (RISAQUAD) 8 billion cell capsule  1 Capsule Oral DAILY    insulin lispro (HUMALOG)   SubCUTAneous AC&HS    glucagon (GLUCAGEN) injection 1 mg  1 mg IntraMUSCular PRN    dextrose (D50W) injection syrg 12.5 g  25 mL IntraVENous PRN    glucose chewable tablet 12 g  3 Tablet Oral PRN    amLODIPine (NORVASC) tablet 10 mg  10 mg Oral DAILY    metoprolol tartrate (LOPRESSOR) tablet 50 mg  50 mg Oral Q12H    sodium chloride (NS) flush 5-40 mL  5-40 mL IntraVENous Q8H    sodium chloride (NS) flush 5-40 mL  5-40 mL IntraVENous PRN    acetaminophen (TYLENOL) tablet 650 mg  650 mg Oral Q4H PRN    zinc sulfate (ZINCATE) 50 mg zinc (220 mg) capsule 1 Capsule  1 Capsule Oral DAILY    ascorbic acid (vitamin C) (VITAMIN C) tablet 500 mg  500 mg Oral BID    aspirin chewable tablet 81 mg  81 mg Oral DAILY    hydrALAZINE (APRESOLINE) 20 mg/mL injection 10 mg  10 mg IntraVENous Q4H PRN          LAB AND IMAGING FINDINGS:     Lab Results   Component Value Date/Time    WBC 12.4 (H) 07/21/2021 05:33 AM    HGB 11.7 07/21/2021 05:33 AM    PLATELET 948 49/68/5965 05:33 AM     Lab Results   Component Value Date/Time    Sodium 139 07/21/2021 05:33 AM    Potassium 3.6 07/21/2021 05:33 AM    Chloride 103 07/21/2021 05:33 AM    CO2 28 07/21/2021 05:33 AM    BUN 32 (H) 07/21/2021 05:33 AM    Creatinine 1.02 07/21/2021 05:33 AM    Calcium 9.1 07/21/2021 05:33 AM      Lab Results   Component Value Date/Time    Alk. phosphatase 64 07/21/2021 05:33 AM    Protein, total 6.7 07/21/2021 05:33 AM    Albumin 2.9 (L) 07/21/2021 05:33 AM    Globulin 3.8 07/21/2021 05:33 AM     No results found for: INR, PTMR, PTP, PT1, PT2, APTT, INREXT   No results found for: IRON, FE, TIBC, IBCT, PSAT, FERR   Lab Results   Component Value Date/Time    pH 7.41 07/13/2021 09:32 PM    PCO2 31 (L) 07/13/2021 09:32 PM    PO2 64 (L) 07/13/2021 09:32 PM     No components found for: GLPOC   No results found for: CPK, CKMB             Total time:   Counseling / coordination time, spent as noted above:   > 50% counseling / coordination?:     Prolonged service was provided for  []30 min   []75 min in face to face time in the presence of the patient, spent as noted above. Time Start:   Time End:   Note: this can only be billed with 99573 (initial) or 18694 (follow up). If multiple start / stop times, list each separately.

## 2021-07-22 NOTE — PROGRESS NOTES
Spiritual Care Assessment/Progress Note  [facility]      NAME: Guilherme Connell      MRN: [id number]  AGE: 79 y.o. SEX: female  Orthodox Affiliation: Catholic   Language: English     7/22/2021           Spiritual Assessment begun in [address] [place] [address] 4 IM 2 through conversation with:         []Patient        [] Family    [] Friend(s)        Reason for Consult: Rapid response team     Spiritual beliefs: (Please include comment if needed)     [] Identifies with a adam tradition:         [] Supported by a adam community:            [] Claims no spiritual orientation:           [] Seeking spiritual identity:                [] Adheres to an individual form of spirituality:           [x] Not able to assess:                           Identified resources for coping:      [] Prayer                               [] Music                  [] Guided Imagery     [] Family/friends                 [] Pet visits     [] Devotional reading                         [] Unknown     [] Other:                                              Interventions offered during this visit: (See comments for more details)    Patient Interventions: Crisis           Plan of Care:     [] Support spiritual and/or cultural needs    [] Support AMD and/or advance care planning process      [] Support grieving process   [] Coordinate Rites and/or Rituals    [] Coordination with community clergy   [] No spiritual needs identified at this time   [] Detailed Plan of Care below (See Comments)  [] Make referral to Music Therapy  [] Make referral to Pet Therapy     [] Make referral to Addiction services  [] Make referral to [facility]  [] Make referral to Spiritual Care Partner  [] No future visits requested        [] Follow up upon further referrals     Comments: Responded to RRT called for Ms Jennifer Lockett in room 422. Patient was on COVID+ isolation;  did not enter her room. Multiple staff members were attending to patient. No family was present.  Please notify  if support desire. : Rev. Shante Brown.  Rafael Cabello; [facility], to contact [phone] [address] Richardson call: [phone]

## 2021-07-22 NOTE — PROGRESS NOTES
2145 Pt refusing all medications. Took off her pulse oximeter and HFNC and is yelling, \"do not touch me I wont put that on leave me alone, you can't make me! \" RT tried talking to patient also to educating her on the importance of her HFNC and why we needed to know her sats. Still refusing. Provider notified. 0400 Pt pulling off tele leads and HFNC. Yelling at RN to \"get out of my face\" when trying to put oxygen back on pt. Refusing any PRN medication for anxiety at this time. Pt repeating \"This is inhumane, I want to go home\" Provider aware. 0445 Rapid Response called because oxygen saturations in the 70's 's patient becoming very agitated. Pt wanted to call son, RN assisted her with the telephone and educated her on the importance of HFNC. Patient agreed to wear oxygen for now. Sats in the 90's. 0600  Pt refusing morning labs.  Provider aware

## 2021-07-23 VITALS
RESPIRATION RATE: 19 BRPM | BODY MASS INDEX: 31.16 KG/M2 | HEIGHT: 65 IN | TEMPERATURE: 98.2 F | HEART RATE: 88 BPM | WEIGHT: 187 LBS | SYSTOLIC BLOOD PRESSURE: 154 MMHG | DIASTOLIC BLOOD PRESSURE: 98 MMHG | OXYGEN SATURATION: 92 %

## 2021-07-23 PROCEDURE — 74011250636 HC RX REV CODE- 250/636: Performed by: NURSE PRACTITIONER

## 2021-07-23 PROCEDURE — 74011000250 HC RX REV CODE- 250: Performed by: NURSE PRACTITIONER

## 2021-07-23 RX ORDER — BACITRACIN 500 UNIT/G
1 PACKET (EA) TOPICAL
Status: COMPLETED | OUTPATIENT
Start: 2021-07-23 | End: 2021-07-23

## 2021-07-23 RX ORDER — HALOPERIDOL 5 MG/ML
1 INJECTION INTRAMUSCULAR ONCE
Status: COMPLETED | OUTPATIENT
Start: 2021-07-23 | End: 2021-07-23

## 2021-07-23 RX ADMIN — BACITRACIN 1 PACKET: 500 OINTMENT TOPICAL at 04:54

## 2021-07-23 RX ADMIN — HALOPERIDOL LACTATE 1 MG: 5 INJECTION, SOLUTION INTRAMUSCULAR at 01:00

## 2021-07-23 NOTE — PROGRESS NOTES
Bedside shift change report given to [facility], [facility] (oncoming nurse) by Prabhakar Myles RN (offgoing nurse). Report included the following information SBAR, Kardex, ED Summary, Intake/Output, MAR, Accordion, Recent Results, Med Rec Status, Cardiac Rhythm NSR and Alarm Parameters .

## 2021-07-23 NOTE — PROGRESS NOTES
At approximately 0030 RN alerted to pt off of the monitor. Primary RN entered the room and found the pt dressed in her own clothes, leads removed, highflow O2 off. Pt non-compliant, agitated and screaming that she wants to leave. RN attempted to put O2 back on as pt sats were in the high 70's to low 80's and visually dsypneic. Pt refused to let RN touch her. RN called out for help and two more RNs came to assist. Pt still refused to listen. RN was able to to get leads back on but not O2, pt still screaming at RNs. Giovanni Owens called. Secondary RN called son and explained the situation. The son stated he is coming to get her. Primary RN exited room to talk to Katherine Leung hospitalist NP. New orders for additional  haldol received and secondary RN gave the medication. Primary RN called the son and explained the situation, stating \"without O2 your mother sats are in the 66-80s, this is not compatible with life. If she leaves it will be AMA as the hospitalist will not discharge her. \" Son stated \"I am coming to get her\", RN tried to respond but son hung up the phone. RT was able to put highflow back on while pt awaited th arrival of her son. 0130: Pt son and daughter arrived to  pt, they entered room and pt and children began screaming at each other. Nursing supervisor called, hospitalist NP called, and one  called to be on standby. Awaiting arrival of hospitalist NP, stated \"be by soon\". 0554: White,Hospitalist NP arrived to speak with family. 0200: Pt A/Ox2. family does not want to sign AMA form but want pt to sign form herself. Pt is not oriented and not able to consent for herself. Dr Cheryl Jiang arrived to come speak with family and try to further explain situation. 0220: Family decided they want pt transferred to Mumtaz Edgar NP and Dr. Cheryl Jiang said they would get started on it. RN entered room to check on pt and pt not wearing O2. Pt refused to put it back on.  Rn placed both the highflow nasal cannula and the nonrebreather next to pt and stated \"if you feel short of breath, here is your oxygen\" pt ignored statement and did not put on O2. Will continue to monitor. 8613: RN attempted to draw labs, pt and pt's daughter refused. RN attempted to get vital and pt/pt's daughter refused to let RN take temp/BP. Daughter stated \"don't stir her\". 3529: Dr. Tyler Beard came to speak to pt's family about update on transfer, the hospital ([facility]) is not accepting transfers. Family still upset that pt is here and now says they are willing to sign AMA form. Dr Tyler Beard explained we are waiting to hear from administration to move forward with that process. Pt family upset that pt has not been wearing O2 this whole time. Prior to this, pt's daughter had told this RN to \"not touch her\" (the patient). RN brought this up to pt's daughter and daughter agreed that she said that, stating \"I absolutely said that\". RN asked family if they wanted the O2 back on and they refused, even though they were upset it was not on. Will continue to monitor. At approximately 18, Dr Tyler Beard came up and signed the [facility] form with the pt and her family. Pt and family educated of risks of leaving, pt family still signed AMA form. RN removed PICC line as family packed up pt.     0510: Pt's family wheeled pt off unit via wheelchair. 0630: Pt's son called the unit and said \"I think we left her (the pt's) phone and money\". This RN went and searched the room, and found did not find either item. Instead RN found a phone , a pair of underwear, and a wadded up napkin with two spit out pills in it. RN talked to the son over the phone and explained that we could not find a phone or money but told him we did find a phone  and underwear he stated \"ok never mind bye\" and hung up the phone.

## 2021-07-23 NOTE — PROGRESS NOTES
01:00am RT went in room to put patient on High flow nasal canula, patient is refusing to wear any oxygen or pulse oz. Patient said she is tired and wants to go home. RT explained to the patient the benefits and risk associated with wearing or not wearing her oxygen.    01:17 patient allowed RT to put high flow oxygen back on until daughter and son arrive

## 2021-07-23 NOTE — DISCHARGE SUMMARY
At approximately 00 30 hours, code atlas was activated after patient was noted to have discontinued ECG leads and high flow oxygen. Per chart review, patient is noted to be hypoxic with oxygen saturations in high 70s to low 80s. Patient was agitated and [place] was activated. Patient family was notified and per chart review, expressed that he will be in hospital to  patient. Chart review shows patient has had intermittent bursts of agitation and request to leave hospital but typically returns to call us and has had low-dose antipsychotics added to her treatment regimen. Patient is a 77-year-old -American female admitted to the hospital for acute hypoxic respiratory failure secondary to COVID-19 pneumonia. [facility] but participating provider for white matter reported family's wishes to take patient home. Had family meeting with patient's son Raffy Zhang and daughter Akash Pena. Family at this time expresses dissatisfaction with patient's care although they are unable to pinpoint any particular incidences during her stay to remain concerning. Initially refusing to sign any AMA documentation by ER adamant about taking patient home. Patient was seen and examined at bedside and she expressed a strong desire to leave hospital reporting that she was on the verge of leaving [address] After thorough chart review, patient is noted to have intermittent episodes of waxing and waning mentation due to oxygen noncompliance and intermittent hypoxia. Explained to patient and family that at this time, patient would not be considered of sound mentation to sign AMA documentation and if it was left to her to make that decision, MD will request a medical TDO. Family at this time requested that patient be transferred to [facility].  I agreed and transfer request was made for particular transfer center.   [facility] at this time unable to accept any transfers due to divergent status. I reported this to family who insisted that was line as they had personally called [facility] and were told beds were available. At this time, family is refusing any further treatment and adamant that they will take patient home and are not willing to entertain any further discussion. Nursing supervisor Delia was contacted for further input and  on-call was consulted. Per administration, family had right to take patient home [address] and declined treatment as they cannot be kept against her well. Adverse problems related to this decision including bodily harm and death have been discussed with the patient and/or family. The patient and/or family do not appear intoxicated and appear to be capable of understanding and making a decision of such gravity. The patient and/or family express understanding of the possible adverse outcomes of their decision and still express the wish to leave against medical advice. The patient and/or family were given follow up and return instructions and the available laboratory tests and x-rays were discussed. The patient and/or family were given the opportunity to ask questions. AMA documentation signed with primary bedside RN as witness. The patient and/or family agree to follow up with a physician of their choice as soon as possible or to return to hospital at any time for further assistance and treatment.     Molly Nair MD

## 2021-07-23 NOTE — FORENSIC NURSE
[facility]NE responded to Code [place]. Nursing Supervisor, HPD, and security present. The patient was medicated with Haldol for safety.

## 2021-07-23 NOTE — PROGRESS NOTES
Hospitalist Night Cover     Name: Bryan Aaron  YOB: 1950      Overnight update:        Code atlas called due to agitation with increasing aggressive behavior. Similar episode to prior days - removing oxygen with rapid decline in oxygen saturation down to 70%s. Patient has been receiving PRN haldol with mild improvement. - Additional 1mg haldol given during code atlas. Goal patient/staff safety, ideally replace oxygen as agitation improves. Discussed with nursing/respiratory staff to try any modality that she will tolerate. - Attending progress notes and palliative care notes reviewed. Patient's delirium documented on prior days, with worsening during episodes of hypoxia.  - Son called during code atlas, comes to hospital stating that he wishes to take patient home at her request. Discussion involving myself, son, and daughter. Reiterated current oxygen need with high risk for rapid deterioration without oxygen. Advised that although she states she wishes to go home, she does not appear capable of understanding the consequences of leaving the hospital.  - Despite discussion, son adamant that he will take patient home. Explained that this will be against medical advice with a high likelihood of permanent debility or death. He expresses his understanding, but won't agree to [facility] form. Wishes for patient to sign out AMA. Explained that I do not feel patient has the understanding to sign herself out against medical advice. Addendum 80  - Case discussed with Dr Vince Penny, hospitalist cross cover attending. He has seen patient and discussed case with family. Family now requesting transfer to [address] [facility] to [facility] transfer center regarding initiating patient requested transfer to [facility] Massimo Ballard). [facility] transfer center has informed us [facility] is currently on diversion and closed to outside hospitals.   [id number]  - Case discussed with nursing supervisor who has briefed  on call  - Family was notified, has elected to take patient home AMA. Both myself and Dr Leonardo Jimenez have stressed risks of leaving hospital as well as benefits of remaining inpatient till at least the morning when the full team has arrived. Family declines further admission/treatment, takes patient home against medical advice. Advised to call 911/go to nearest emergency department if patient's condition declines.       Parth Watson FNP-C, PA-C  [phone] or [place]

## 2021-07-23 NOTE — ADT AUTH CERT NOTES
Comments  Comment     Last edited by  on [address] [facility].    Patient Demographics    Patient Name   Priya Brown   [id number] Legal Sex   Female    1950 [place]   [address] [address] Phone   [phone] (Home)   [phone] (Mobile) *Preferred*   Patient Demographics    Patient Name   Priya Brown   [id number] Legal Sex   Female    1950 [place]   [address] [address] Phone   [phone] (Home)   [phone] (Mobile) *Preferred*   CSN:   [id number]   Admit Date: Admit Time Room Bed   2021  9:58  [89901] 01 [[id number]]   Attending Providers    Provider Pager From To   Srinivas Nguyen MD  21   Henrique Lake MD  21   Jolly Villa MD  21   Ranjan Robertson MD  07/14/21 07/15/21   Krupa Chang MD  07/15/21 07/22/21   Leidy Wooten MD  21   Emergency Contact(s)    Name Relation Home Work [facility] ISAC Liu [phone]     Darin Ohara   [phone]   Utilization Reviews       Viral Illness, Acute - Care Day 9 (2021) by Kathryn Carreno RN       Review Entered Review Status   2021 14:26 Completed      Criteria Review      Care Day: 9 Care Date: 2021 Level of Care: Telemetry    Guideline Day 3    Level Of Care    (X) Floor to discharge    2021 14:24:05 EDT by Kole Pham      tele bed    Clinical Status    (X) * Hemodynamic stability    (X) * Afebrile or temperature acceptable for next level of care    2021 14:24:05 EDT by Kole Pham      97.9 86 28 94 135/61 high flow nc    (X) * Tachypnea absent    (X) * Hypoxemia absent    (X) * Mental status at baseline    (X) * Renal function at baseline, or stable and acceptable for next level of care    ( ) * Discharge plans and education understood    Activity    (X) * Ambulatory or acceptable for next level of care    Routes    (X) * Oral hydration    ( ) * Oral medications or regimen acceptable for next level of care    (X) * Oral diet or acceptable for next level of care    Interventions    ( ) * Isolation not indicated, or is performable at next level of care    (X) Pulse oximetry    Medications    (X) * Antimicrobial medication absent or regimen established for next level of care    (X) Possible DVT prophylaxis    7/21/2021 14:26:16 EDT by Forrest Rizo    Subject: Additional Clinical Information    wbc 12.4 bun 32 bun ratio 31 gfr 54 alb 2.9 ag raito 0.8 ast 45       * Milestone   Additional Notes   7/21/21            Viral Illness, Acute - Care Day 6 (7/18/2021) by Isaak Sharp RN       Review Entered Review Status   7/19/2021 07:54 Completed      Criteria Review      Care Day: 6 Care Date: 7/18/2021 Level of Care: Telemetry    Guideline Day 3    Level Of Care    (X) Floor to discharge    Clinical Status    ( ) * Hemodynamic stability    7/19/2021 07:54:56 EDT by Guerita Orellana      /101/118, RR 22, O2 DROPPED TO 88 ON HFNC -  ON HEATED HFNC @ 25    (X) * Afebrile or temperature acceptable for next level of care    7/19/2021 07:54:56 EDT by Guerita Orellana      TEMP 98.8    ( ) * Tachypnea absent    7/19/2021 07:54:56 EDT by Algie Goldberg 16-30    ( ) * Hypoxemia absent    7/19/2021 07:54:56 EDT by Guerita Orellana      O2 DROPPED TO 88 ON HFNC -  ON HEATED HFNC @ 25    (X) * Mental status at baseline    7/19/2021 07:54:56 EDT by Luis Kim A&O X 3    (X) * Renal function at baseline, or stable and acceptable for next level of care    7/19/2021 07:54:56 EDT by Guerita Orellana      BUN 24, CREAT 1.09    ( ) * Discharge plans and education understood    Activity    (X) * Ambulatory or acceptable for next level of care    7/19/2021 07:54:56 EDT by Guerita Orellana      W/ ASSIST    Routes    (X) * Oral hydration    ( ) * Oral medications or regimen acceptable for next level of care    7/19/2021 07:54:56 EDT by Steve Torres      DECADRON 6 MG QD IV, LASIX 40 MG QD IV, ZINC QD PO    (X) * Oral diet or acceptable for next level of care    Interventions    ( ) * Isolation not indicated, or is performable at next level of care    7/19/2021 07:54:56 EDT by Nathan Wright    (X) Pulse oximetry    Medications    (X) * Antimicrobial medication absent or regimen established for next level of care    7/19/2021 07:54:56 EDT by Steve Torres      NO ABX    (X) Possible DVT prophylaxis    7/19/2021 07:54:56 EDT by Braeden Shukla SQ    * Milestone   Additional Notes   IM NOTE   She is agitated however alert. Sitting in bed. When I explained to her if she continue to do this she will have to end up worsening of her respiratory status and end on the ventilator. At this point she said she does not want intubation and being on the ventilator ,repeatedly saying\" I make my won decision,\" She said she is okay with chest compression. When I tried to explain to her the connection between CPR and intubation,she said she did not want to talk about this anymore. She is alert and oriented to place and person,she could tell she came Tuesday (which is correct),she thought today is Saturday!,knew the Ramon Rosas name of the president,she could remember and  tell us correctly her daughter's and son's phone numbers. She was said to have some confusion night time and was seen by night nP on 7/16 for episode of confusion  When it was noted\"on exam, patient is alert and oriented to person and place. Max Dust answers questions appropriately and follows commands. \"       Acute hypoxic respiratory failure   D/t SARS-CoV-2 infection    CTA no PE but findings consistent with atypical viral pneumonia   C/w Remdesivir, dexamethasone   Acterma x 1 dose. No evidence of pneumonia/infetion,d/cd  emperic Abx ,7/17   Follow inflammatory markers. DVT prophylaxis per Covid protocol. D-dimer 0.65 on 7/15   On oxygen via high flow nasal cannula wean off as able to keep SPO2> 90%,patient poorly complaint with keeping the HFNC. Pulmonary following. Continue IV diuretics.       Uncontrolled DM with hyperglycemia precipitated by steroid and acute illness   A1C10.3   She mentions not taking any meds currently   Increase Lantus to 20 units nightly.  Added Premeal Humalog 6 units AC 3 times daily   Humalog sliding scale coverage.        Elevated creatinine: Normalized   --Unknown baseline.        Uncontrolled HTN:BP not on target,but fairly stable mainly in the 083-547O systolically   C/w Metoprolol, amlodipine   Hydralazine prn       Access: PICC line placed on 7/15       Constitutional: Alert and oriented,agitatated    HEENT: Atraumatic. Oral mucosa moist,. Non icteric sclera. No pallor. Resp: On oxygen via high flow nasal cannula: Symmetrical air entry. Chest Wall: No deformity   CV: Regular rhythm, normal rate, no murmurs, gallops, rubs    GI: Soft, non distended, non tender. normoactive bowel sounds, no hepatosplenomegaly    : No CVA or suprapubic tenderness    Musculoskeletal: No edema, warm, 2+ pulses throughout    Neurologic: Mental status:AAOx3,agitated. Cranial nerves II-XII : WNL   Motor exam:Moves all extremities symmetrically          INTENSIVIST CONSULT   At this time, Ms. Michael Angel seems to be breathing comfortably and oxygenating well on high flow. She was counseled again by myself and Dr. Megan Swift regarding keeping the oxygen on and the importance of why we are doing this. In current state, she can remain in [facility] with close observation. If she continues to remove the oxygen and desaturate or respiratory status worsens, please do not hesitate to call us back and we will gladly revisit. Spoke to Dr. Megan Swift to advise. Reassessed patient at 12 and discussed with nursing. Has only had 1 episode of desaturation to the 80s which occurred with getting OOB to bathroom.  On exam she is > 92% on current oxygen settings and RR ~ 20 without use of accessory muscles.           /99, TEMP 98.8, /101/118, RR 22, O2 DROPPED TO 88 ON HFNC -  ON HEATED HFNC @ 25      D DIMER 2.02, CRP 1.56, GLUC 149, BUN 24, CREAT 1.09      NO IMAGING      RT, INTENSIVIST CONSULT, PICC, I/O, CARD MONITORING, DROPLET PLUS

## 2021-07-24 ENCOUNTER — PATIENT OUTREACH (OUTPATIENT)
Dept: CASE MANAGEMENT | Age: 71
End: 2021-07-24

## 2021-10-19 ENCOUNTER — TRANSCRIBE ORDER (OUTPATIENT)
Dept: SCHEDULING | Age: 71
End: 2021-10-19

## 2021-10-19 DIAGNOSIS — M81.0 SENILE OSTEOPOROSIS: Primary | ICD-10-CM

## 2021-10-19 DIAGNOSIS — Z12.31 VISIT FOR SCREENING MAMMOGRAM: ICD-10-CM

## 2021-11-16 ENCOUNTER — TRANSCRIBE ORDER (OUTPATIENT)
Dept: SCHEDULING | Age: 71
End: 2021-11-16

## 2021-11-16 DIAGNOSIS — J44.9 COPD (CHRONIC OBSTRUCTIVE PULMONARY DISEASE) (HCC): Primary | ICD-10-CM

## 2021-12-01 ENCOUNTER — HOSPITAL ENCOUNTER (OUTPATIENT)
Dept: BONE DENSITY | Age: 71
Discharge: HOME | End: 2021-12-01
Attending: INTERNAL MEDICINE
Payer: MEDICARE

## 2021-12-01 ENCOUNTER — HOSPITAL ENCOUNTER (OUTPATIENT)
Dept: MAMMOGRAPHY | Age: 71
Discharge: HOME | End: 2021-12-01
Attending: INTERNAL MEDICINE
Payer: MEDICARE

## 2021-12-01 DIAGNOSIS — M81.0 SENILE OSTEOPOROSIS: ICD-10-CM

## 2021-12-01 DIAGNOSIS — Z12.31 VISIT FOR SCREENING MAMMOGRAM: ICD-10-CM

## 2021-12-01 PROCEDURE — 77080 DXA BONE DENSITY AXIAL: CPT

## 2021-12-01 PROCEDURE — 77067 SCR MAMMO BI INCL CAD: CPT

## 2022-03-18 PROBLEM — U07.1 COVID-19: Status: ACTIVE | Noted: 2021-07-13

## 2024-01-17 ENCOUNTER — HOSPITAL ENCOUNTER (OUTPATIENT)
Facility: HOSPITAL | Age: 74
Discharge: HOME | End: 2024-01-20
Payer: MEDICARE

## 2024-01-17 VITALS — BODY MASS INDEX: 31.08 KG/M2 | WEIGHT: 198 LBS | HEIGHT: 67 IN

## 2024-01-17 DIAGNOSIS — Z12.31 VISIT FOR SCREENING MAMMOGRAM: ICD-10-CM

## 2024-01-17 PROCEDURE — 77063 BREAST TOMOSYNTHESIS BI: CPT

## 2024-01-29 NOTE — ROUTINE PROCESS
TRANSFER - OUT REPORT:    Verbal report given to Tatum Dimas RN(name) on Stefanie Blake  being transferred to , room 422(unit) for routine progression of care       Report consisted of patients Situation, Background, Assessment and   Recommendations(SBAR). Information from the following report(s) SBAR, ED Summary and MAR was reviewed with the receiving nurse. Lines:   Peripheral IV 07/13/21 Left Antecubital (Active)   Site Assessment Clean, dry, & intact 07/13/21 1033   Phlebitis Assessment 0 07/13/21 1033   Infiltration Assessment 0 07/13/21 1033   Dressing Status Clean, dry, & intact 07/13/21 1033   Dressing Type Transparent 07/13/21 1033   Hub Color/Line Status Pink 07/13/21 1033   Action Taken Blood drawn 07/13/21 1033        Opportunity for questions and clarification was provided.       Patient transported with:   [facility]